# Patient Record
Sex: FEMALE | Race: WHITE | NOT HISPANIC OR LATINO | Employment: FULL TIME | URBAN - METROPOLITAN AREA
[De-identification: names, ages, dates, MRNs, and addresses within clinical notes are randomized per-mention and may not be internally consistent; named-entity substitution may affect disease eponyms.]

---

## 2017-01-13 ENCOUNTER — TELEPHONE (OUTPATIENT)
Dept: OBSTETRICS AND GYNECOLOGY | Facility: CLINIC | Age: 42
End: 2017-01-13

## 2017-01-13 NOTE — TELEPHONE ENCOUNTER
Pt needs a letter stating when she goes back to work next week that she can only work 1/2 days. Pt would like this sent via e-mail.      Pt e-mail [email or web address]

## 2017-01-13 NOTE — LETTER
January 13, 2017    Reshma Rogers  [address] [address]         [facility]  [address]  [address] [place] [address]  Phone: [phone]  Fax: [phone] January 13, 2017     Patient: Reshma Rogers   YOB: 1975   Date of Visit: 1/13/2017       To Whom It May Concern:    It is my medical opinion that Reshma Rogers should only work half days since she had a hysterectomy on 12-30-16 & is still recovering until her next office visit.   If you have any questions or concerns, please don't hesitate to call.    Sincerely,        Guero Zhu M.D.

## 2017-01-19 ENCOUNTER — OFFICE VISIT (OUTPATIENT)
Dept: OBSTETRICS AND GYNECOLOGY | Facility: CLINIC | Age: 42
End: 2017-01-19
Payer: COMMERCIAL

## 2017-01-19 VITALS
DIASTOLIC BLOOD PRESSURE: 84 MMHG | HEIGHT: 64 IN | SYSTOLIC BLOOD PRESSURE: 138 MMHG | WEIGHT: 171.94 LBS | BODY MASS INDEX: 29.35 KG/M2

## 2017-01-19 DIAGNOSIS — Z48.89 POSTOPERATIVE VISIT: Primary | ICD-10-CM

## 2017-01-19 PROCEDURE — 99999 PR PBB SHADOW E&M-EST. PATIENT-LVL III: CPT | Mod: PBBFAC,,, | Performed by: OBSTETRICS & GYNECOLOGY

## 2017-01-19 PROCEDURE — 99024 POSTOP FOLLOW-UP VISIT: CPT | Mod: S$GLB,,, | Performed by: OBSTETRICS & GYNECOLOGY

## 2017-01-19 NOTE — MR AVS SNAPSHOT
"    [facility]  [address]  [place] [address]  Phone: [phone]  Fax: [phone]                  Reshma Calvo [facility]   2017 11:15 AM   Office Visit    Description:  Female : 1975   Provider:  Guero Zhu MD   Department:  [facility]           Reason for Visit     Post-op Evaluation                To Do List           Future Appointments        Provider Department Dept Phone    2017 1:00 PM Guero Zhu MD [facility] [phone]      Goals (5 Years of Data)     None      [facility]     [facility] -  Assistance  Registered nurses in the [facility] Call Center provide clinical advisement, health education, appointment booking, and other advisory services.  Call for this free service at [phone].             Medications           Message regarding Medications     Verify the changes and/or additions to your medication regime listed below are the same as discussed with your clinician today.  If any of these changes or additions are incorrect, please notify your healthcare provider.        STOP taking these medications     ondansetron (ZOFRAN, AS HYDROCHLORIDE,) 4 MG tablet Take 1 tablet (4 mg total) by mouth every 6 (six) hours as needed for Nausea.    oxycodone-acetaminophen (PERCOCET) 7.5-325 mg per tablet Take 1 tablet by mouth every 4 (four) hours as needed for Pain.           Verify that the below list of medications is an accurate representation of the medications you are currently taking.  If none reported, the list may be blank. If incorrect, please contact your healthcare provider. Carry this list with you in case of emergency.           Current Medications     valacyclovir (VALTREX) 500 MG tablet            Clinical Reference Information           Vital Signs - Last Recorded  Most recent update: 2017 11:37 AM by Isamar May MA    BP Ht Wt LMP BMI    138/84 5' 4" (1.626 m) " 78 kg (171 lb 15.3 oz) 12/03/2016 (Exact Date) 29.52 kg/m2      Blood Pressure          Most Recent Value    BP  138/84      Allergies as of 1/19/2017     Ambien [Zolpidem]    Aspirin    Nsaids (Non-steroidal Anti-inflammatory Drug)    Neosporin [Neomycin-bacitracin-polymyxin]      Immunizations Administered on Date of Encounter - 1/19/2017     None

## 2017-01-19 NOTE — PROGRESS NOTES
"Subjective:       Reshma Rogers is a 41 y.o. female who presents to the clinic 2 weeks status post Davinci assisted hysterectomy for fibroids. Eating a regular diet without difficulty. Bowel movements are normal. The patient is not having any pain.    The following portions of the patient's history were reviewed and updated as appropriate: allergies, past family history, past medical history, past social history, past surgical history and problem list.    Review of Systems  Pertinent items are noted in HPI.      Objective:        Visit Vitals    /84    Ht 5' 4" (1.626 m)    Wt 78 kg (171 lb 15.3 oz)    LMP 12/03/2016 (Exact Date)    BMI 29.52 kg/m2     General:  alert, appears stated age and cooperative   Abdomen: soft, bowel sounds active, non-tender   Incision:       healing well, no drainage, no erythema, no hernia, no seroma, no swelling, no dehiscence, incision well approximated         Assessment:      Doing well postoperatively.  Operative findings again reviewed. Pathology report discussed.      Plan:      1. Continue any current medications.  2. Wound care discussed.  3. Activity restrictions: no sports  4. Anticipated return to work: 2 wks  5. Follow up: . 6 weeks  "

## 2017-01-20 ENCOUNTER — TELEPHONE (OUTPATIENT)
Dept: OBSTETRICS AND GYNECOLOGY | Facility: CLINIC | Age: 42
End: 2017-01-20

## 2017-01-20 NOTE — TELEPHONE ENCOUNTER
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

## 2017-01-20 NOTE — TELEPHONE ENCOUNTER
Bone pt, red, tender spot on incision that Dr Zhu took the steristrip off of yesterday when she was here. It is not bleeding or oozing but hurts when water hits it and pt would like to know if she needs to be seen again or if she should cover it . Also, pt needs note saying she

## 2017-01-20 NOTE — TELEPHONE ENCOUNTER
Meryl  Agree all inc looked great yesterday   Agree with bandaid  If not better by Mon then she can come in    Isamar can you send a note to work that she can return and cont 1/2 days for until first week of feb

## 2017-01-20 NOTE — TELEPHONE ENCOUNTER
"3 weeks post op pt---After taking the steri strip off at her post op visit yesterday she noticed the area is more tender and burns when water hits it.  She reports  "raw skin" on her incision like the skin is not closed all the way (at the surface).  It is not draining or bleeding.  She is worried because her incision opened after her .  Advised no heavy lifting or exercise and to monitor over the weekend and if it worsens to call Monday for an appt.  Reassured her she can put a band aid on it when she showers if it helps with the burning.      Isamar,  She needs a note for to continue working half days next week (per Dr. Zhu) emailed to [email or web address].  She thought you were sending it yesterday but never got it in her email or junk email.      Please call when you send note.   "

## 2017-01-23 NOTE — TELEPHONE ENCOUNTER
Pt calling to check on her note for work. She said this Friday, 1/27 will be her 4th week after her surgery and she said at her appt on 1/19 it was discussed to return back to work in 2 weeks. She needs a new note emailed to her at [email or web address].

## 2017-01-27 ENCOUNTER — OFFICE VISIT (OUTPATIENT)
Dept: OBSTETRICS AND GYNECOLOGY | Facility: CLINIC | Age: 42
End: 2017-01-27
Payer: COMMERCIAL

## 2017-01-27 VITALS
HEIGHT: 64 IN | WEIGHT: 172.75 LBS | DIASTOLIC BLOOD PRESSURE: 62 MMHG | BODY MASS INDEX: 29.49 KG/M2 | SYSTOLIC BLOOD PRESSURE: 110 MMHG

## 2017-01-27 DIAGNOSIS — Z09 POSTOP CHECK: Primary | ICD-10-CM

## 2017-01-27 PROCEDURE — 99999 PR PBB SHADOW E&M-EST. PATIENT-LVL II: CPT | Mod: PBBFAC,,, | Performed by: OBSTETRICS & GYNECOLOGY

## 2017-01-27 PROCEDURE — 99024 POSTOP FOLLOW-UP VISIT: CPT | Mod: S$GLB,,, | Performed by: OBSTETRICS & GYNECOLOGY

## 2017-01-27 NOTE — MR AVS SNAPSHOT
"    [facility]  [address]  [place] [address]  Phone: [phone]  Fax: [phone]                  Reshma Calvo [facility]   2017 8:45 AM   Office Visit    Description:  Female : 1975   Provider:  Rachana Suh MD   Department:  [facility]           Reason for Visit     Post-op Evaluation           Diagnoses this Visit        Comments    Postop check    -  Primary            To Do List           Future Appointments        Provider Department Dept Phone    2017 1:00 PM Guero Zhu MD [facility] [phone]      Goals (5 Years of Data)     None      Follow-Up and Disposition     Return in 3 weeks (on 2017) for postop with Dr. Zhu.    Follow-up and Disposition History      [facility]     [facility] Nurse Care [facility] -  Assistance  Registered nurses in the [facility] On Call Center provide clinical advisement, health education, appointment booking, and other advisory services.  Call for this free service at [phone].             Medications           Message regarding Medications     Verify the changes and/or additions to your medication regime listed below are the same as discussed with your clinician today.  If any of these changes or additions are incorrect, please notify your healthcare provider.             Verify that the below list of medications is an accurate representation of the medications you are currently taking.  If none reported, the list may be blank. If incorrect, please contact your healthcare provider. Carry this list with you in case of emergency.           Current Medications     valacyclovir (VALTREX) 500 MG tablet            Clinical Reference Information           Vital Signs - Last Recorded  Most recent update: 2017  9:13 AM by Brooklyn Pagan MA    BP Ht Wt LMP BMI    110/62 5' 4" (1.626 m) 78.4 kg (172 lb 11.7 oz) 2016 (Exact Date) 29.65 kg/m2      Blood Pressure  "         Most Recent Value    BP  110/62      Allergies as of 1/27/2017     Ambien [Zolpidem]    Aspirin    Nsaids (Non-steroidal Anti-inflammatory Drug)    Neosporin [Neomycin-bacitracin-polymyxin]      Immunizations Administered on Date of Encounter - 1/27/2017     None

## 2017-02-09 ENCOUNTER — PATIENT MESSAGE (OUTPATIENT)
Dept: ADMINISTRATIVE | Facility: OTHER | Age: 42
End: 2017-02-09

## 2017-02-16 ENCOUNTER — OFFICE VISIT (OUTPATIENT)
Dept: OBSTETRICS AND GYNECOLOGY | Facility: CLINIC | Age: 42
End: 2017-02-16
Payer: COMMERCIAL

## 2017-02-16 VITALS
WEIGHT: 175.69 LBS | SYSTOLIC BLOOD PRESSURE: 122 MMHG | HEIGHT: 64 IN | DIASTOLIC BLOOD PRESSURE: 70 MMHG | BODY MASS INDEX: 29.99 KG/M2

## 2017-02-16 DIAGNOSIS — D24.9 FIBROADENOMA OF BREAST, UNSPECIFIED LATERALITY: Primary | ICD-10-CM

## 2017-02-16 DIAGNOSIS — Z48.89 POSTOPERATIVE VISIT: ICD-10-CM

## 2017-02-16 PROCEDURE — 99024 POSTOP FOLLOW-UP VISIT: CPT | Mod: S$GLB,,, | Performed by: OBSTETRICS & GYNECOLOGY

## 2017-02-16 PROCEDURE — 99999 PR PBB SHADOW E&M-EST. PATIENT-LVL III: CPT | Mod: PBBFAC,,, | Performed by: OBSTETRICS & GYNECOLOGY

## 2017-02-16 NOTE — MR AVS SNAPSHOT
"    [facility]  [address]  [place] [address]  Phone: [phone]  Fax: [phone]                  Reshma Calvo [facility]   2017 1:00 PM   Office Visit    Description:  Female : 1975   Provider:  Guero Zhu MD   Department:  [facility]           Reason for Visit     Post-op Evaluation           Diagnoses this Visit        Comments    Fibroadenoma of breast, unspecified laterality    -  Primary            To Do List           Future Appointments        Provider Department Dept Phone    2017 2:45 PM Guero Zhu MD [facility] [phone]      Goals (5 Years of Data)     None      [facility]     [facility] Care [facility] -  Assistance  Registered nurses in the [facility] provide clinical advisement, health education, appointment booking, and other advisory services.  Call for this free service at [phone].             Medications           Message regarding Medications     Verify the changes and/or additions to your medication regime listed below are the same as discussed with your clinician today.  If any of these changes or additions are incorrect, please notify your healthcare provider.             Verify that the below list of medications is an accurate representation of the medications you are currently taking.  If none reported, the list may be blank. If incorrect, please contact your healthcare provider. Carry this list with you in case of emergency.           Current Medications     valacyclovir (VALTREX) 500 MG tablet            Clinical Reference Information           Your Vitals Were     BP Height Weight Last Period BMI    122/70 5' 4" (1.626 m) 79.7 kg (175 lb 11.3 oz) 2016 (Exact Date) 30.16 kg/m2      Blood Pressure          Most Recent Value    BP  122/70      Allergies as of 2017     Ambien [Zolpidem]    Aspirin    Nsaids (Non-steroidal Anti-inflammatory Drug)    " Neosporin [Neomycin-bacitracin-polymyxin]      Immunizations Administered on Date of Encounter - 2/16/2017     None      Orders Placed During Today's Visit      Normal Orders This Visit    US Breast Left Complete     Future Labs/Procedures Expected by Expires    US Breast Left Complete  2/16/2017 4/16/2018      Language Assistance Services     ATTENTION: Language assistance services are available, free of charge. Please call [phone].      ATENCIÓN: Si habla amanda, tiene a cortes disposición servicios gratuitos de asistencia lingüística. Llame al [phone].     [facility] Ý: N?u b?n nói Ti?ng Vi?t, có các d?ch v? h? tr? ngôn ng? mi?n phí dành cho b?n. G?i s? [phone].         [facility] complies with applicable Federal civil rights laws and does not discriminate on the basis of race, color, national origin, age, disability, or sex.

## 2017-02-16 NOTE — PROGRESS NOTES
CC: Postop exam    Reshma Rogers is a 41 y.o. female  presents for a postop exam exam.  She is established.  LMP: Patient's last menstrual period was 2016 (exact date)..   Patient without complaints.  Feeling much better.  Back at work  Now 7 weeks postop from da Brandon hysterectomy.     Health Maintenance   Topic Date Due    Lipid Panel  1975    Pneumococcal PCV13 (High Risk) (1 - PCV13 Required) 10/10/1976    TETANUS VACCINE  10/10/1993    Pneumococcal PPSV23 (High Risk) (1) 10/10/1993    Mammogram  10/10/2015    Influenza Vaccine  2016    Pap Smear  2019         Past Medical History   Diagnosis Date    Abnormal Pap smear of cervix      prior had mild dysplasia treared with cryo 2004,nl since per pt     Anxiety     Anxiety and depression     Asthma     Herpes simplex virus (HSV) infection     Infertility, female     PCOS (polycystic ovarian syndrome)     Polycythemia     Thrombophilia      MTHFR I7850G/ [id number]    Thyroid disease      history of hyporthyroidism       Past Surgical History   Procedure Laterality Date    Rhinoplasty tip  2005    Breast augmentation  2006     & removal     section      Gynecologic cryosurgery  2004    Gynecologic cryosurgery  2004    Dilation and curettage of uterus      Hysterectomy  2016     . Robotic Hysterectomy       OB History    Para Term  AB SAB TAB Ectopic Multiple Living   2 1  1 1 1    1      # Outcome Date GA Lbr Dennis/2nd Weight Sex Delivery Anes PTL Lv   2   33w0d  2.07 kg (4 lb 9 oz) M CS-LTranv   Y   1 SAB  6w0d                 Family History   Problem Relation Age of Onset    Deep vein thrombosis Father     Eclampsia Father     Breast cancer Maternal Grandmother     Diabetes Mother     Ovarian cancer Maternal Aunt     Colon cancer Neg Hx        Social History   Substance Use Topics    Smoking status: Never Smoker    Smokeless tobacco: None     "Alcohol use No       Visit Vitals    /70    Ht 5' 4" (1.626 m)    Wt 79.7 kg (175 lb 11.3 oz)    LMP 12/03/2016 (Exact Date)    BMI 30.16 kg/m2         ROS:  GENERAL: Denies weight gain or weight loss. Feeling well overall.   SKIN: Denies rash or lesions.   ABDOMEN: No abdominal pain, constipation, diarrhea, nausea, vomiting or rectal bleeding.   URINARY: No frequency, dysuria, hematuria, or burning on urination.  BREASTS: The patient performs breast self-examination and denies pain, lumps, or nipple discharge.   HEMATOLOGIC: No easy bruisability or excessive bleeding.  MUSCULOSKELETAL: Denies joint pain or swelling.   NEUROLOGIC: Denies syncope or weakness.   PSYCHIATRIC: Denies depression, anxiety or mood swings.    Physical Exam:    APPEARANCE: Well nourished, well developed, in no acute distress.  AFFECT: WNL, alert and oriented x 3  SKIN: Abdominal incisions clear dry and intact  ABDOMEN: Soft.  No tenderness or masses.  No hepatosplenomegaly.  No hernias.  PELVIC: Normal external genitalia without lesions.  Normal hair distribution.  Adequate perineal body, normal urethral meatus.  Vagina moist and well rugated without lesions or discharge.  Vaginal cuff intact.  No signs of granulation tissue.  Cervix and uterus absent. .  Adnexa without masses or tenderness.    EXTREMITIES: No edema.    ASSESSMENT AND PLAN  1. Fibroadenoma of breast, unspecified laterality  US Breast Left Complete    US Breast Left Complete   2. Postoperative visit   postop doing well.  7 weeks out from da Brandon hysterectomy.  May resume normal activity including exercise.  Can resume intercourse at 8 weeks postop.         Patient was counseled today on A.C.S. Pap guidelines and recommendations for yearly pelvic exams, mammograms and monthly self breast exams; to see her PCP for other health maintenance.     No Follow-up on file.      "

## 2017-04-07 ENCOUNTER — PATIENT MESSAGE (OUTPATIENT)
Dept: OBSTETRICS AND GYNECOLOGY | Facility: CLINIC | Age: 42
End: 2017-04-07

## 2017-04-07 ENCOUNTER — TELEPHONE (OUTPATIENT)
Dept: OBSTETRICS AND GYNECOLOGY | Facility: CLINIC | Age: 42
End: 2017-04-07

## 2017-04-07 NOTE — TELEPHONE ENCOUNTER
Pt states she has a 6mo follow up breast ultrasound for a fibroadenoma in May.  Last night she found a different lump that is palpable and visible to the eye in the same breast.  Recommended she move up the breast ultrasound a couple weeks to be seen sooner.      Dr. Zhu should we see her again for this mass?

## 2017-04-07 NOTE — TELEPHONE ENCOUNTER
The earliest she was able to move it up to was Thursday 4/13 at 2:30pm at [facility].  Scheduled appt with Maren 4/17 @0840 when you are in the office.

## 2017-04-07 NOTE — TELEPHONE ENCOUNTER
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 Last night she found a lump that is visible to the eye and can be felt. Pt states it is no where near the original fibroadenoma. Pt states that she isn't sure if the fibroadenoma moved or what. Pt would like to know if she should move her u/s up or what she should do. Please call pt at [phone].

## 2017-04-13 DIAGNOSIS — N63.0 BREAST LUMP IN FEMALE: Primary | ICD-10-CM

## 2017-04-17 ENCOUNTER — OFFICE VISIT (OUTPATIENT)
Dept: OBSTETRICS AND GYNECOLOGY | Facility: CLINIC | Age: 42
End: 2017-04-17
Payer: COMMERCIAL

## 2017-04-17 ENCOUNTER — TELEPHONE (OUTPATIENT)
Dept: OBSTETRICS AND GYNECOLOGY | Facility: CLINIC | Age: 42
End: 2017-04-17

## 2017-04-17 VITALS
SYSTOLIC BLOOD PRESSURE: 118 MMHG | DIASTOLIC BLOOD PRESSURE: 80 MMHG | BODY MASS INDEX: 28.63 KG/M2 | HEIGHT: 64 IN | WEIGHT: 167.69 LBS

## 2017-04-17 DIAGNOSIS — N63.23 BREAST LUMP ON LEFT SIDE AT 5 O'CLOCK POSITION: ICD-10-CM

## 2017-04-17 DIAGNOSIS — N63.21 BREAST LUMP ON LEFT SIDE AT 1 O'CLOCK POSITION: Primary | ICD-10-CM

## 2017-04-17 DIAGNOSIS — N63.21 BREAST LUMP ON LEFT SIDE AT 1 O'CLOCK POSITION: ICD-10-CM

## 2017-04-17 DIAGNOSIS — N63.25 BREAST LUMP ON LEFT SIDE AT 6 O'CLOCK POSITION: Primary | ICD-10-CM

## 2017-04-17 PROCEDURE — 1160F RVW MEDS BY RX/DR IN RCRD: CPT | Mod: S$GLB,,, | Performed by: NURSE PRACTITIONER

## 2017-04-17 PROCEDURE — 99999 PR PBB SHADOW E&M-EST. PATIENT-LVL III: CPT | Mod: PBBFAC,,, | Performed by: NURSE PRACTITIONER

## 2017-04-17 PROCEDURE — 99213 OFFICE O/P EST LOW 20 MIN: CPT | Mod: S$GLB,,, | Performed by: NURSE PRACTITIONER

## 2017-04-17 RX ORDER — AMOXICILLIN 500 MG
2 CAPSULE ORAL DAILY
COMMUNITY

## 2017-04-17 NOTE — MR AVS SNAPSHOT
[facility]  [address]  [place] [address]  Phone: [phone]  Fax: [phone]                  Reshma Calvo [facility]   2017 8:40 AM   Office Visit    Description:  Female : 1975   Provider:  Maren Peña NP   Department:  [facility]           Reason for Visit     Follow-up           Diagnoses this Visit        Comments    Breast lump on left side at 1 o'clock position    -  Primary     Breast lump on left side at 5 o'clock position                To Do List           Future Appointments        Provider Department Dept Phone    2017 2:45 PM Guero Zhu MD [facility] [phone]      Goals (5 Years of Data)     None      Follow-Up and Disposition     Return if symptoms worsen or fail to improve.    Follow-up and Disposition History      [facility]     [facility] On Call Nurse Care Line - 24/ Assistance  Unless otherwise directed by your provider, please contact [facility] On-Call, our nurse care line that is available for  assistance.     Registered nurses in the [facility] On Call Center provide: appointment scheduling, clinical advisement, health education, and other advisory services.  Call: [phone] (toll free)               Medications           Message regarding Medications     Verify the changes and/or additions to your medication regime listed below are the same as discussed with your clinician today.  If any of these changes or additions are incorrect, please notify your healthcare provider.             Verify that the below list of medications is an accurate representation of the medications you are currently taking.  If none reported, the list may be blank. If incorrect, please contact your healthcare provider. Carry this list with you in case of emergency.           Current Medications     fish oil-omega-3 fatty acids 300-1,000 mg capsule Take 2 g by mouth once daily.    valacyclovir (VALTREX) 500 MG  "tablet            Clinical Reference Information           Your Vitals Were     BP Height Weight Last Period BMI    118/80 5' 4" (1.626 m) 76.1 kg (167 lb 10.6 oz) 12/03/2016 (Exact Date) 28.78 kg/m2      Blood Pressure          Most Recent Value    BP  118/80      Allergies as of 4/17/2017     Ambien [Zolpidem]    Aspirin    Nsaids (Non-steroidal Anti-inflammatory Drug)    Neosporin [Neomycin-bacitracin-polymyxin]      Immunizations Administered on Date of Encounter - 4/17/2017     None      Language Assistance Services     ATTENTION: Language assistance services are available, free of charge. Please call [phone].      ATENCIÓN: Si trung patel, tiene a cortes disposición servicios gratuitos de asistencia lingüística. Llame al [phone].     ALONDRA Ý: N?u b?n nói Ti?ng Vi?t, có các d?ch v? h? tr? ngôn ng? mi?n phí dành cho b?n. G?i s? [phone].         [facility] complies with applicable Federal civil rights laws and does not discriminate on the basis of race, color, national origin, age, disability, or sex.        "

## 2017-04-17 NOTE — TELEPHONE ENCOUNTER
Pt called to follow up with Maren to see if she ever received the fax from [facility]. Pt hasn't heard back from anyone and was concerned.

## 2017-04-17 NOTE — PROGRESS NOTES
"Chief Complaint   Patient presents with    Follow-up     Dr Zhu Pt:  Follow up on MMG and U/S for Left Breast Lump, done 17 @ [facility]       Reshma Rogers is a 41 y.o. female  presents for a Follow-up (Dr Zhu Pt:  Follow up on MMG and U/S for Left Breast Lump, done 17 @ [facility])  Pt has history of left breast fibroadenoma (around 1 o'clock) and has been getting mammogram & U/S every 6 months to follow at [facility].  She also reports that last week she noticed a "large lump to her left breast, beneath areola; non-painful".    Complaints/Concerns: left breast lump x 2    Patient's last menstrual period was 2016 (exact date).    Past Medical History:   Diagnosis Date    Abnormal Pap smear of cervix     prior had mild dysplasia treared with cryo ,nl since per pt     Anxiety     Anxiety and depression     Asthma     Breast disorder 2017    Left breast Lump    Herpes simplex virus (HSV) infection     Infertility, female     PCOS (polycystic ovarian syndrome)     Polycythemia     Thrombophilia     MTHFR D4779R/ B8211A    Thyroid disease     history of hyporthyroidism       Past Surgical History:   Procedure Laterality Date    breast augmentation  2006    & removal     SECTION      DILATION AND CURETTAGE OF UTERUS      GYNECOLOGIC CRYOSURGERY  2004    GYNECOLOGIC CRYOSURGERY  2004    HYSTERECTOMY  2016    . Robotic Hysterectomy    RHINOPLASTY TIP         OB History    Para Term  AB SAB TAB Ectopic Multiple Living   2 1  1 1 1    1      # Outcome Date GA Lbr Dennis/2nd Weight Sex Delivery Anes PTL Lv   2   33w0d  2.07 kg (4 lb 9 oz) M CS-LTranv Spinal  Y   1 SAB  6w0d                 Family History   Problem Relation Age of Onset    Deep vein thrombosis Father     Eclampsia Father     Breast cancer Maternal Grandmother     Cancer Maternal Grandmother     Diabetes Mother     Ovarian cancer Maternal Aunt     " "Colon cancer Neg Hx        Social History   Substance Use Topics    Smoking status: Never Smoker    Smokeless tobacco: None    Alcohol use Yes      Comment: Occational       /80  Ht 5' 4" (1.626 m)  Wt 76.1 kg (167 lb 10.6 oz)  LMP 12/03/2016 (Exact Date) Comment: TVH/LSO 12-30-16  BMI 28.78 kg/m2    ROS:    Review of Systems   Constitutional: Negative.    Respiratory: Negative.    Cardiovascular: Negative.    Neurological: Negative.        Physical Exam:    Physical Exam:   Constitutional: She is oriented to person, place, and time. She appears well-developed.    HENT:   Head: Normocephalic.    Eyes: Pupils are equal, round, and reactive to light.    Neck: Normal range of motion.     Pulmonary/Chest: Effort normal. Right breast exhibits no inverted nipple, no mass, no nipple discharge, no skin change, no tenderness, presence, no bleeding and no swelling. Left breast exhibits mass. Left breast exhibits no inverted nipple, no nipple discharge, no skin change, no tenderness, presence, no bleeding and no swelling. Breasts are symmetrical (bilat implpants noted).                      Musculoskeletal: Normal range of motion.       Neurological: She is alert and oriented to person, place, and time.    Skin: Skin is warm and dry.        ASSESSMENT AND PLAN  Breast lump on left side at 1 o'clock position    Breast lump on left side at 5 o'clock position    * D/W patient that report has not been done yet by radiologist at [facility], but that I will continue to look out for it today.  At that point, we can discuss plan of care.  (She was told by u/s tech at [facility] that radiologist did not se anything on mammo or u/s, so pt is concerned since she can still feel both lumps.  May consider second opinion with breast surgeon at [facility].    Patient was counseled today on A.C.S. Pap guidelines and recommendations for yearly pelvic exams, mammograms and monthly self breast exams; to see her PCP for other health maintenance. "     Return if symptoms worsen or fail to improve.

## 2017-04-19 ENCOUNTER — TELEPHONE (OUTPATIENT)
Dept: OBSTETRICS AND GYNECOLOGY | Facility: CLINIC | Age: 42
End: 2017-04-19

## 2017-04-19 NOTE — TELEPHONE ENCOUNTER
Pt says she got a call from [facility] to schedule breast u/s but pt thought Maren was supposed to send her to [facility]. Pt will call ins co to find out if they will cover her having a repeat u/s at [facility].

## 2017-04-21 ENCOUNTER — HOSPITAL ENCOUNTER (OUTPATIENT)
Dept: RADIOLOGY | Facility: HOSPITAL | Age: 42
Discharge: HOME | End: 2017-04-21
Attending: OBSTETRICS & GYNECOLOGY
Payer: COMMERCIAL

## 2017-04-21 DIAGNOSIS — N63.0 BREAST LUMP IN FEMALE: ICD-10-CM

## 2017-04-25 ENCOUNTER — HOSPITAL ENCOUNTER (OUTPATIENT)
Dept: RADIOLOGY | Facility: HOSPITAL | Age: 42
Discharge: HOME | End: 2017-04-25
Attending: RADIOLOGY

## 2017-04-26 ENCOUNTER — TELEPHONE (OUTPATIENT)
Dept: OBSTETRICS AND GYNECOLOGY | Facility: CLINIC | Age: 42
End: 2017-04-26

## 2017-04-27 ENCOUNTER — PATIENT MESSAGE (OUTPATIENT)
Dept: ADMINISTRATIVE | Facility: OTHER | Age: 42
End: 2017-04-27

## 2017-04-27 ENCOUNTER — TELEPHONE (OUTPATIENT)
Dept: RADIOLOGY | Facility: HOSPITAL | Age: 42
End: 2017-04-27

## 2017-04-27 NOTE — TELEPHONE ENCOUNTER
Spoke with patient. Reviewed breast biopsy procedure and reviewed instructions for breast biopsy. Patient expressed understanding and all questions were answered. Provided patient with my phone number to call for any further concerns or questions.   Patient scheduled ultrasound and breast biopsy at the [facility] on 5/11/2017

## 2017-05-11 ENCOUNTER — HOSPITAL ENCOUNTER (OUTPATIENT)
Dept: RADIOLOGY | Facility: HOSPITAL | Age: 42
Discharge: HOME | End: 2017-05-11
Attending: OBSTETRICS & GYNECOLOGY
Payer: COMMERCIAL

## 2017-05-11 DIAGNOSIS — R92.8 ABNORMAL MAMMOGRAM: ICD-10-CM

## 2017-05-11 PROCEDURE — 19083 BX BREAST 1ST LESION US IMAG: CPT | Mod: ,,, | Performed by: RADIOLOGY

## 2017-05-11 PROCEDURE — 88305 TISSUE EXAM BY PATHOLOGIST: CPT | Performed by: PATHOLOGY

## 2017-05-11 PROCEDURE — 76642 ULTRASOUND BREAST LIMITED: CPT | Mod: 26,LT,, | Performed by: RADIOLOGY

## 2017-05-11 PROCEDURE — 76642 ULTRASOUND BREAST LIMITED: CPT | Mod: TC,LT

## 2017-05-11 PROCEDURE — 77065 DX MAMMO INCL CAD UNI: CPT | Mod: TC,LT

## 2017-05-11 PROCEDURE — 77065 DX MAMMO INCL CAD UNI: CPT | Mod: 26,LT,, | Performed by: RADIOLOGY

## 2017-05-11 PROCEDURE — 88305 TISSUE EXAM BY PATHOLOGIST: CPT | Mod: 26,,, | Performed by: PATHOLOGY

## 2017-05-11 PROCEDURE — 27201068 US BREAST BIOPSY WITH IMAGING 1ST SITE LEFT

## 2017-05-12 ENCOUNTER — TELEPHONE (OUTPATIENT)
Dept: SURGERY | Facility: CLINIC | Age: 42
End: 2017-05-12

## 2017-05-12 NOTE — TELEPHONE ENCOUNTER
Called patient with the results of her left breast biopsy, explained that it showed a fibroadenoma,  no atypia/benign, all questions answered, pt advised to follow up in 6 months with repeat imaging per core biopsy protocol, advised case will be reviewed in the weekly core conference and pt will be notified if there are any changes. Patient verbalized understanding of all information

## 2017-05-25 ENCOUNTER — OFFICE VISIT (OUTPATIENT)
Dept: OBSTETRICS AND GYNECOLOGY | Facility: CLINIC | Age: 42
End: 2017-05-25
Payer: COMMERCIAL

## 2017-05-25 VITALS
HEIGHT: 64 IN | SYSTOLIC BLOOD PRESSURE: 126 MMHG | DIASTOLIC BLOOD PRESSURE: 84 MMHG | BODY MASS INDEX: 27.48 KG/M2 | WEIGHT: 160.94 LBS

## 2017-05-25 DIAGNOSIS — Z12.39 BREAST CANCER SCREENING: ICD-10-CM

## 2017-05-25 DIAGNOSIS — Z01.419 ENCOUNTER FOR GYNECOLOGICAL EXAMINATION: Primary | ICD-10-CM

## 2017-05-25 PROCEDURE — 99396 PREV VISIT EST AGE 40-64: CPT | Mod: S$GLB,,, | Performed by: OBSTETRICS & GYNECOLOGY

## 2017-05-25 PROCEDURE — 99999 PR PBB SHADOW E&M-EST. PATIENT-LVL III: CPT | Mod: PBBFAC,,, | Performed by: OBSTETRICS & GYNECOLOGY

## 2017-05-25 NOTE — PROGRESS NOTES
CC: Well woman exam    Reshma Rogers is a 41 y.o. female  presents for a well woman exam.  She is established.LMP: Patient's last menstrual period was 2016 (exact date)..  Patient without complaints.  Doing and feeling well.  Status post hysterectomy in December.   Annual Exam, happy with hysterectomy. Son finished chemo. Last mammo  and had breast biopsy at [facility].      Health Maintenance   Topic Date Due    Lipid Panel  1975    Pneumococcal PCV13 (High Risk) (1 - PCV13 Required) 10/10/1976    TETANUS VACCINE  10/10/1993    Pneumococcal PPSV23 (High Risk) (1) 10/10/1993    Influenza Vaccine  2017    Mammogram  2018       Past Medical History:   Diagnosis Date    Abnormal Pap smear of cervix     prior had mild dysplasia treared with cryo ,nl since per pt     Anxiety     Anxiety and depression     Asthma     Breast disorder 2017    Left breast Lump    Herpes simplex virus (HSV) infection     Infertility, female     PCOS (polycystic ovarian syndrome)     Polycythemia     Thrombophilia     MTHFR T0285M/ S0245X    Thyroid disease     history of hyporthyroidism     Past Surgical History:   Procedure Laterality Date    breast augmentation  2006    & removal     SECTION      DILATION AND CURETTAGE OF UTERUS      GYNECOLOGIC CRYOSURGERY  2004    HYSTERECTOMY  2016    . Robotic Hysterectomy    RHINOPLASTY TIP       Family History   Problem Relation Age of Onset    Deep vein thrombosis Father     Eclampsia Father     Breast cancer Maternal Grandmother     Cancer Maternal Grandmother     Diabetes Mother     Ovarian cancer Maternal Aunt     Colon cancer Neg Hx      Social History   Substance Use Topics    Smoking status: Never Smoker    Smokeless tobacco: Not on file    Alcohol use Yes      Comment: Occational     OB History      Para Term  AB Living    2 1  1 1 1    SAB TAB Ectopic Multiple Live  "Births    1    1          /84   Ht 5' 4" (1.626 m)   Wt 73 kg (160 lb 15 oz)   LMP 12/03/2016 (Exact Date)   BMI 27.62 kg/m²     ROS:  GENERAL: Denies weight gain or weight loss. Feeling well overall.   SKIN: Denies rash or lesions.   HEAD: Denies head injury or headache.   NODES: Denies enlarged lymph nodes.   CHEST: Denies chest pain or shortness of breath.   CARDIOVASCULAR: Denies palpitations or left sided chest pain.   ABDOMEN: No abdominal pain, constipation, diarrhea, nausea, vomiting or rectal bleeding.   URINARY: No frequency, dysuria, hematuria, or burning on urination.  REPRODUCTIVE: See HPI.   BREASTS: The patient performs breast self-examination and denies pain, lumps, or nipple discharge.   HEMATOLOGIC: No easy bruisability or excessive bleeding.   MUSCULOSKELETAL: Denies joint pain or swelling.   NEUROLOGIC: Denies syncope or weakness.   PSYCHIATRIC: Denies depression, anxiety or mood swings.    PE:   APPEARANCE: Well nourished, well developed, in no acute distress.  AFFECT: WNL, alert and oriented x 3.  SKIN: No acne or hirsutism.  ABDOMEN: Soft. No tenderness or masses. No hepatosplenomegaly. No hernias.  BREASTS: Symmetrical, no skin changes or visible lesions. No palpable masses, nipple discharge bilaterally.  PELVIC: Normal external female genitalia without lesions. Normal hair distribution. Adequate perineal body, normal urethral meatus. Vaginal cuff intact. Small area of granulation tissue noted at cuff and tx with silver nitrate. Bimanual exam shows uterus and cervix to be surgically absent. Adnexa without masses or tenderness.  EXTREMITIES: No edema.      ICD-10-CM ICD-9-CM    1. Encounter for gynecological examination Z01.419 V72.31 Vag cuff with small area of gran tissue-- tx with silver nitrate   F/u 2 months   2. Breast cancer screening Z12.39 V76.10 Mammo Digital Screening Bilat with Tomosynthesis CAD           Patient was counseled today on A.C.S. Pap guidelines and " recommendations for yearly pelvic exams, mammograms and monthly self breast exams; to see her PCP for other health maintenance.     Return in about 1 year (around 5/25/2018).

## 2017-07-24 ENCOUNTER — OFFICE VISIT (OUTPATIENT)
Dept: OBSTETRICS AND GYNECOLOGY | Facility: CLINIC | Age: 42
End: 2017-07-24
Payer: COMMERCIAL

## 2017-07-24 VITALS
HEIGHT: 64 IN | SYSTOLIC BLOOD PRESSURE: 142 MMHG | WEIGHT: 161.25 LBS | DIASTOLIC BLOOD PRESSURE: 82 MMHG | BODY MASS INDEX: 27.53 KG/M2

## 2017-07-24 DIAGNOSIS — N89.8 GRANULATION TISSUE OF VAGINAL CUFF: Primary | ICD-10-CM

## 2017-07-24 PROCEDURE — 99499 UNLISTED E&M SERVICE: CPT | Mod: S$GLB,,, | Performed by: OBSTETRICS & GYNECOLOGY

## 2017-07-24 PROCEDURE — 99999 PR PBB SHADOW E&M-EST. PATIENT-LVL III: CPT | Mod: PBBFAC,,, | Performed by: OBSTETRICS & GYNECOLOGY

## 2017-08-01 ENCOUNTER — PATIENT MESSAGE (OUTPATIENT)
Dept: OBSTETRICS AND GYNECOLOGY | Facility: CLINIC | Age: 42
End: 2017-08-01

## 2017-09-18 RX ORDER — VALACYCLOVIR HYDROCHLORIDE 500 MG/1
500 TABLET, FILM COATED ORAL DAILY
Qty: 30 TABLET | Refills: 6 | Status: SHIPPED | OUTPATIENT
Start: 2017-09-18 | End: 2018-12-03 | Stop reason: SDUPTHER

## 2017-11-09 ENCOUNTER — HOSPITAL ENCOUNTER (OUTPATIENT)
Dept: RADIOLOGY | Facility: HOSPITAL | Age: 42
Discharge: HOME | End: 2017-11-09
Attending: OBSTETRICS & GYNECOLOGY
Payer: COMMERCIAL

## 2017-11-09 VITALS — WEIGHT: 161 LBS | BODY MASS INDEX: 27.49 KG/M2 | HEIGHT: 64 IN

## 2017-11-09 DIAGNOSIS — Z12.39 BREAST CANCER SCREENING: ICD-10-CM

## 2017-11-09 DIAGNOSIS — N63.25 BREAST LUMP ON LEFT SIDE AT 6 O'CLOCK POSITION: ICD-10-CM

## 2017-11-09 DIAGNOSIS — N63.21 BREAST LUMP ON LEFT SIDE AT 1 O'CLOCK POSITION: ICD-10-CM

## 2017-11-09 PROCEDURE — 77062 BREAST TOMOSYNTHESIS BI: CPT | Mod: 26,,, | Performed by: RADIOLOGY

## 2017-11-09 PROCEDURE — 77066 DX MAMMO INCL CAD BI: CPT | Mod: 26,,, | Performed by: RADIOLOGY

## 2017-11-09 PROCEDURE — 76642 ULTRASOUND BREAST LIMITED: CPT | Mod: 26,RT,, | Performed by: RADIOLOGY

## 2017-11-09 PROCEDURE — 77066 DX MAMMO INCL CAD BI: CPT | Mod: TC

## 2017-11-14 NOTE — PROGRESS NOTES
Lm Cochran,  I have received and reviewed your mammogram and ultrasound report from the breast center.      Yourreport states that your mmg and u/s is completely stable.   You have or will receive your mammogram report with all the info below but I wanted you to be aware...    [facility] provides its mammography patients with an individualized estimated lifetime risk assessment.  The estimate is calculated from information you provided at the time of your mammography visit (such as family history of breast cancer) and your history in your [facility] electronic medical record.      Your estimated lifetime risk of breast cancer (to age 85) based   on Tyrer-Cuzick - 7 risk assessment model is: Tyrer-Cuzick: 21%     Breast cancer risk is a complex and often confusing topic.    The average lifetime risk of breast cancer is approximately 13%.   Patients with a lifetime risk of more than 20% are considered to have an elevated risk of developing breast cancer.    The American Caner Society recommends that women with a 20% or greater lifetime risk consider supplemental screening exams such as annual Breast MRI.    Our breast care coordinators ([facility] - [phone])  can provide additional information and if desired can schedule an appointment with our breast specialists Dr Corado or Dr Shaikh to discuss your risk assessment, risk modification and supplemental screening exams such as breast MRI and other supplemental screening.    If you have any questions or concerns, don't hesitate to message me.  Take care,  Dr Zhu

## 2018-04-26 ENCOUNTER — OFFICE VISIT (OUTPATIENT)
Dept: OBSTETRICS AND GYNECOLOGY | Facility: CLINIC | Age: 43
End: 2018-04-26
Payer: COMMERCIAL

## 2018-04-26 VITALS
WEIGHT: 163.13 LBS | DIASTOLIC BLOOD PRESSURE: 78 MMHG | SYSTOLIC BLOOD PRESSURE: 134 MMHG | HEIGHT: 64 IN | BODY MASS INDEX: 27.85 KG/M2

## 2018-04-26 DIAGNOSIS — Z01.419 ENCOUNTER FOR GYNECOLOGICAL EXAMINATION: Primary | ICD-10-CM

## 2018-04-26 DIAGNOSIS — Z91.89 AT HIGH RISK FOR BREAST CANCER: ICD-10-CM

## 2018-04-26 PROCEDURE — 99396 PREV VISIT EST AGE 40-64: CPT | Mod: S$GLB,,, | Performed by: OBSTETRICS & GYNECOLOGY

## 2018-04-26 PROCEDURE — 99999 PR PBB SHADOW E&M-EST. PATIENT-LVL III: CPT | Mod: PBBFAC,,, | Performed by: OBSTETRICS & GYNECOLOGY

## 2018-04-29 NOTE — PROGRESS NOTES
"CCChi Complaint Well woman exam:  Well Woman (Annual Exam, last mammo  [facility], elevated risk assesment 21%)      Reshma Rogers is a 42 y.o. female  presents for a well woman exam.    She is established.  No issues, problems, or complaints.   Specifically, patient denies abnormal vaginal bleeding, discharge and odor", or "pelvic pain.  No menopausal sx       LMP: none  S/p Hyst  Cycles:None s/p hysterectomy    Last pap: 2016 normal hpv neg  Last MM2017 Normal 21%  Last colonoscopy: n/a      Current Outpatient Prescriptions:     fish oil-omega-3 fatty acids 300-1,000 mg capsule, Take 2 g by mouth once daily., Disp: , Rfl:     valacyclovir (VALTREX) 500 MG tablet, Take 1 tablet (500 mg total) by mouth once daily., Disp: 30 tablet, Rfl: 6    Past Medical History:   Diagnosis Date    Abnormal Pap smear of cervix     prior had mild dysplasia treared with cryo ,nl since per pt     Anxiety     Anxiety and depression     Asthma     Breast disorder 2017    Left breast Lump    Herpes simplex virus (HSV) infection     Infertility, female     PCOS (polycystic ovarian syndrome)     Polycythemia     Thrombophilia     MTHFR Y4088G/ S3672G    Thyroid disease     history of hyporthyroidism       Past Surgical History:   Procedure Laterality Date    BREAST AUGMENTATION  2006    BREAST BIOPSY Left 2017    BREAST IMPLANT REMOVAL        SECTION      DILATION AND CURETTAGE OF UTERUS      GYNECOLOGIC CRYOSURGERY  2004    HYSTERECTOMY  2016    . Robotic DVH/LSO (Right ovary remains)     OOPHORECTOMY Left 2016    Right ovary remains     RHINOPLASTY TIP         OB History    Para Term  AB Living   4 3 2 1 1 1   SAB TAB Ectopic Multiple Live Births   1       1      # Outcome Date GA Lbr Dennis/2nd Weight Sex Delivery Anes PTL Lv   4   33w0d  2.07 kg (4 lb 9 oz) M CS-LTranv Spinal  RITA   3 SAB  6w0d          2 Term    " "        1 Term                   Family History   Problem Relation Age of Onset    Deep vein thrombosis Father     Eclampsia Father     Breast cancer Maternal Grandmother     Cancer Maternal Grandmother     Diabetes Mother     Ovarian cancer Maternal Aunt     Colon cancer Neg Hx        Social History   Substance Use Topics    Smoking status: Never Smoker    Smokeless tobacco: Never Used    Alcohol use Yes      Comment: Occational         ROS:    GENERAL: Denies weight gain or weight loss. Feeling well overall.   SKIN: Denies rash or lesions.   HEENT: Denies headaches, or vision changes.   CARDIOVASCULAR: Denies palpitations or left sided chest pain.   RESPIRATORY: Denies shortness of breath or dyspnea on exertion.  BREASTS: Denies pain, lumps, or nipple discharge.   ABDOMEN: Denies abdominal pain, constipation, diarrhea, nausea, vomiting, change in appetite or rectal bleeding.   URINARY: Denies frequency, dysuria, hematuria.  NEUROLOGIC: Denies syncope or weakness.   PSYCHIATRIC: Denies depression, anxiety or mood swings.    Physical Exam:  /78   Ht 5' 4" (1.626 m)   Wt 74 kg (163 lb 2.3 oz)   LMP 12/03/2016   BMI 28.00 kg/m²     APPEARANCE: Well nourished, well developed, in no acute distress.  AFFECT: WNL, alert and oriented x 3  SKIN: No acne or hirsutism  BREASTS: Symmetrical, no skin changes.                     No nipple discharge. No palpable masses bilaterally  ABDOMEN: soft Non tender Non distended No masses  PELVIC:   Normal external genitalia without lesions.   Normal urethral meatus. No signif cystocele or rectocele.  Vagina atrophic without lesions or discharge.  Cuff intact  Cervix absent  Adnexa without masses or tenderness.    EXTREMITIES: No edema.    ASSESSMENT AND PLAN  1. Encounter for gynecological examination     2. At high risk for breast cancer         Reshma was seen today for well woman.    Diagnoses and all orders for this visit:    Encounter for gynecological " examination   Annual well woman exam- pap not applicable s/p Hyst  At high risk for breast cancer   Discussed BRCA % and referall to Breast care specialist for MRI eval      Patient was counseled today on A.C.S. Pap guidelines and recommendations for yearly pelvic exams, mammograms and monthly self breast exams; to see her PCP for other health maintenance.     Patient encouraged to register for portal and results will be sent via portal.           Health Maintenance   Topic Date Due    Lipid Panel  1975    Pneumococcal PCV13 (High Risk) (1 - PCV13 Required) 10/10/1976    TETANUS VACCINE  10/10/1993    Pneumococcal PPSV23 (High Risk) (1) 10/10/1993    Influenza Vaccine  08/01/2017    Mammogram  11/09/2018

## 2018-11-05 ENCOUNTER — PATIENT MESSAGE (OUTPATIENT)
Dept: OBSTETRICS AND GYNECOLOGY | Facility: CLINIC | Age: 43
End: 2018-11-05

## 2018-11-05 ENCOUNTER — TELEPHONE (OUTPATIENT)
Dept: OBSTETRICS AND GYNECOLOGY | Facility: CLINIC | Age: 43
End: 2018-11-05

## 2018-11-05 DIAGNOSIS — Z12.31 VISIT FOR SCREENING MAMMOGRAM: Primary | ICD-10-CM

## 2018-11-05 NOTE — TELEPHONE ENCOUNTER
Dr. Zhu patient calling would like orders put in for annual mammo to be done at [facility]

## 2018-11-13 ENCOUNTER — APPOINTMENT (OUTPATIENT)
Dept: RADIOLOGY | Facility: OTHER | Age: 43
End: 2018-11-13
Attending: OBSTETRICS & GYNECOLOGY
Payer: COMMERCIAL

## 2018-11-13 VITALS — WEIGHT: 163 LBS | HEIGHT: 64 IN | BODY MASS INDEX: 27.83 KG/M2

## 2018-11-13 DIAGNOSIS — Z12.31 VISIT FOR SCREENING MAMMOGRAM: ICD-10-CM

## 2018-11-13 PROCEDURE — 77063 BREAST TOMOSYNTHESIS BI: CPT | Mod: 26,,, | Performed by: RADIOLOGY

## 2018-11-13 PROCEDURE — 77063 BREAST TOMOSYNTHESIS BI: CPT | Mod: TC,PN

## 2018-11-13 PROCEDURE — 77067 SCR MAMMO BI INCL CAD: CPT | Mod: 26,,, | Performed by: RADIOLOGY

## 2018-11-14 NOTE — PROGRESS NOTES
Lm Justice,  I have received and reviewed your mammogram report from the breast center.      Your mammogram report states that your mmg is completely normal /stable.   You have or will receive your mammogram report with all the info below but I wanted you to be aware...    [facility] provides its mammography patients with an individualized estimated lifetime risk assessment.  The estimate is calculated from information you provided at the time of your mammography visit (such as family history of breast cancer) and your history in your [facility] electronic medical record.      Your estimated lifetime risk of breast cancer (to age 85) based   on Tyrer-Cuzick - 7 risk assessment model is: Tyrer-Cuzick: 21%     Breast cancer risk is a complex and often confusing topic.    The average lifetime risk of breast cancer is approximately 13%.   Patients with a lifetime risk of more than 20% are considered to have an elevated risk of developing breast cancer.    The American Caner Society recommends that women with a 20% or greater lifetime risk consider supplemental screening exams such as annual Breast MRI.    Our breast care coordinators at [facility] - [phone])  can provide additional information and if desired can schedule an appointment with our breast specialists, Keny Corado or Abbey Shaikh to discuss further your risk assessment, risk modification and supplemental screening exams such as breast MRI and other supplemental screening.    If you have any questions or concerns, don't hesitate to message me.  Take care,  Dr Zhu

## 2018-12-03 RX ORDER — VALACYCLOVIR HYDROCHLORIDE 500 MG/1
500 TABLET, FILM COATED ORAL DAILY
Qty: 30 TABLET | Refills: 6 | Status: SHIPPED | OUTPATIENT
Start: 2018-12-03 | End: 2018-12-03 | Stop reason: SDUPTHER

## 2018-12-03 RX ORDER — VALACYCLOVIR HYDROCHLORIDE 500 MG/1
500 TABLET, FILM COATED ORAL DAILY
Qty: 30 TABLET | Refills: 6 | Status: SHIPPED | OUTPATIENT
Start: 2018-12-03 | End: 2020-02-26 | Stop reason: SDUPTHER

## 2019-01-11 ENCOUNTER — HOSPITAL ENCOUNTER (OUTPATIENT)
Dept: RADIOLOGY | Facility: OTHER | Age: 44
Discharge: HOME | End: 2019-01-11
Attending: PLASTIC SURGERY
Payer: COMMERCIAL

## 2019-01-11 DIAGNOSIS — N63.0 UNSPECIFIED LUMP IN UNSPECIFIED BREAST: ICD-10-CM

## 2019-01-11 PROCEDURE — 77049 MRI BREAST C-+ W/CAD BI: CPT | Mod: 26,,, | Performed by: RADIOLOGY

## 2019-01-11 PROCEDURE — 25500020 PHARM REV CODE 255: Performed by: PLASTIC SURGERY

## 2019-01-11 PROCEDURE — 77049 MRI BREAST C-+ W/CAD BI: CPT | Mod: TC

## 2019-01-11 PROCEDURE — 77049 MRI BREAST W/WO CONTRAST, W/CAD, BILATERAL: ICD-10-PCS | Mod: 26,,, | Performed by: RADIOLOGY

## 2019-01-11 PROCEDURE — A9577 INJ MULTIHANCE: HCPCS | Performed by: PLASTIC SURGERY

## 2019-01-11 RX ADMIN — GADOBENATE DIMEGLUMINE 14 ML: 529 INJECTION, SOLUTION INTRAVENOUS at 10:01

## 2019-05-16 ENCOUNTER — OFFICE VISIT (OUTPATIENT)
Dept: OBSTETRICS AND GYNECOLOGY | Facility: CLINIC | Age: 44
End: 2019-05-16
Payer: COMMERCIAL

## 2019-05-16 VITALS
SYSTOLIC BLOOD PRESSURE: 140 MMHG | HEIGHT: 64 IN | BODY MASS INDEX: 29.4 KG/M2 | WEIGHT: 172.19 LBS | DIASTOLIC BLOOD PRESSURE: 90 MMHG

## 2019-05-16 DIAGNOSIS — Z12.31 ENCOUNTER FOR SCREENING MAMMOGRAM FOR BREAST CANCER: ICD-10-CM

## 2019-05-16 DIAGNOSIS — Z01.419 ENCOUNTER FOR GYNECOLOGICAL EXAMINATION: Primary | ICD-10-CM

## 2019-05-16 PROCEDURE — 99999 PR PBB SHADOW E&M-EST. PATIENT-LVL III: ICD-10-PCS | Mod: PBBFAC,,, | Performed by: OBSTETRICS & GYNECOLOGY

## 2019-05-16 PROCEDURE — 99999 PR PBB SHADOW E&M-EST. PATIENT-LVL III: CPT | Mod: PBBFAC,,, | Performed by: OBSTETRICS & GYNECOLOGY

## 2019-05-16 PROCEDURE — 99396 PR PREVENTIVE VISIT,EST,40-64: ICD-10-PCS | Mod: S$GLB,,, | Performed by: OBSTETRICS & GYNECOLOGY

## 2019-05-16 PROCEDURE — 99396 PREV VISIT EST AGE 40-64: CPT | Mod: S$GLB,,, | Performed by: OBSTETRICS & GYNECOLOGY

## 2019-05-16 NOTE — PROGRESS NOTES
CCChi Complaint Well woman exam:  Annual Exam (hyst has 1 ovary ,  mammo  birads 1 mri breast 2019 normal )      Reshma Rogers is a 43 y.o. female  presents for a well woman exam.    She is established.  .   c/o weight gain over the past year- asking about menopause denies hot flashes or night sweats.  Patient is scheduled to see her primary care physician and will have labs and thyroid labs done there. She will check FSH with labs but most likely they will be normal    LMP: none  S/p Hyst  Last pap: s/p Hyst No pap needed   Last MMG: 2018 Normal had MRI 2019 bilateral fibroadenoma        Current Outpatient Medications:     fish oil-omega-3 fatty acids 300-1,000 mg capsule, Take 2 g by mouth once daily., Disp: , Rfl:     valACYclovir (VALTREX) 500 MG tablet, Take 1 tablet (500 mg total) by mouth once daily., Disp: 30 tablet, Rfl: 6    Past Medical History:   Diagnosis Date    Abnormal Pap smear of cervix     prior had mild dysplasia treared with cryo ,nl since per pt     Anxiety     Anxiety and depression     Asthma     Breast disorder 2017    Left breast Lump    Herpes simplex virus (HSV) infection     Infertility, female     PCOS (polycystic ovarian syndrome)     Polycythemia     Thrombophilia     MTHFR C7280Y/ V4815V    Thyroid disease     history of hyporthyroidism       Past Surgical History:   Procedure Laterality Date    AUGMENTATION OF BREAST      BREAST AUGMENTATION  2006    BREAST BIOPSY Left 2017    BREAST IMPLANT REMOVAL        SECTION      CYSTOSCOPY N/A 2016    Performed by [facility]ero Zhu [facility] at [facility] OR    DILATION AND CURETTAGE OF UTERUS      GYNECOLOGIC CRYOSURGERY  2004    HYSTERECTOMY  2016    . Robotic DVH/LSO (Right ovary remains)     OOPHORECTOMY Left 2016    Right ovary remains     RHINOPLASTY TIP  2005    ROBOTIC ASSISTED LAPAROSCOPIC HYSTERECTOMY with Left Salpingo-Oophorectomy N/A 2016  "   Performed by Guero Zhu MD at [facility] [place]       OB History    Para Term  AB Living   4 3 2 1 1 1   SAB TAB Ectopic Multiple Live Births   1       1      # Outcome Date GA Lbr Dennis/2nd Weight Sex Delivery Anes PTL Lv   4   33w0d  2.07 kg (4 lb 9 oz) M CS-LTranv Spinal  RITA   3 SAB  6w0d          2 Term            1 Term                Family History   Problem Relation Age of Onset    Deep vein thrombosis Father     Eclampsia Father     Breast cancer Maternal Grandmother     Cancer Maternal Grandmother     Diabetes Mother     Ovarian cancer Maternal Aunt     Colon cancer Neg Hx        Social History     Tobacco Use    Smoking status: Never Smoker    Smokeless tobacco: Never Used   Substance Use Topics    Alcohol use: Yes     Comment: Occational    Drug use: No         ROS:    GENERAL: Denies weight gain or weight loss. Feeling well overall.   SKIN: Denies rash or lesions.   HEENT: Denies headaches, or vision changes.   CARDIOVASCULAR: Denies palpitations or left sided chest pain.   RESPIRATORY: Denies shortness of breath or dyspnea on exertion.  BREASTS: Denies pain, lumps, or nipple discharge.   ABDOMEN: Denies abdominal pain, constipation, diarrhea, nausea, vomiting, change in appetite or rectal bleeding.   URINARY: Denies frequency, dysuria, hematuria.  NEUROLOGIC: Denies syncope or weakness.   PSYCHIATRIC: Denies depression, anxiety or mood swings.    Physical Exam:  BP (!) 140/90   Ht 5' 4" (1.626 m)   Wt 78.1 kg (172 lb 2.9 oz)   LMP 2016   BMI 29.55 kg/m²     APPEARANCE: Well nourished, well developed, in no acute distress.  AFFECT: WNL, alert and oriented x 3  SKIN: No acne or hirsutism  BREASTS: Symmetrical, no skin changes.                     No nipple discharge. No palpable masses bilaterally  NODES: No inguinal nor axillary LAD  ABDOMEN: soft Non tender Non distended No masses  PELVIC:   Normal external genitalia without lesions.   Normal urethral meatus. " No signif cystocele or rectocele.  Vagina atrophic without lesions or discharge.  Cuff intact  Cervix absent  Adnexa without masses or tenderness.    EXTREMITIES: No edema.    ASSESSMENT AND PLAN  Reshma was seen today for annual exam.    Diagnoses and all orders for this visit:    Encounter for gynecological examination    Encounter for screening mammogram for breast cancer  -     Mammo Digital Screening Bilat w/ Andrew; Future          Patient was counseled today on A.C.S. Pap guidelines and recommendations for yearly pelvic exams, mammograms and monthly self breast exams; to see her PCP for other health maintenance.     Patient encouraged to register for portal and results will be sent via portal.    Follow up in about 1 year (around 5/16/2020).         Health Maintenance   Topic Date Due    Lipid Panel  1975    TETANUS VACCINE  10/10/1993    Pneumococcal Vaccine (Highest Risk) (1 of 3 - PCV13) 10/10/1994    Influenza Vaccine  08/01/2019    Mammogram  11/13/2019

## 2019-06-04 ENCOUNTER — PATIENT MESSAGE (OUTPATIENT)
Dept: OBSTETRICS AND GYNECOLOGY | Facility: CLINIC | Age: 44
End: 2019-06-04

## 2019-11-19 ENCOUNTER — APPOINTMENT (OUTPATIENT)
Dept: RADIOLOGY | Facility: OTHER | Age: 44
End: 2019-11-19
Attending: OBSTETRICS & GYNECOLOGY
Payer: COMMERCIAL

## 2019-11-19 VITALS — HEIGHT: 64 IN | WEIGHT: 172.19 LBS | BODY MASS INDEX: 29.4 KG/M2

## 2019-11-19 DIAGNOSIS — Z12.31 ENCOUNTER FOR SCREENING MAMMOGRAM FOR BREAST CANCER: ICD-10-CM

## 2019-11-19 PROCEDURE — 77063 MAMMO DIGITAL SCREENING BILAT WITH TOMOSYNTHESIS_CAD: ICD-10-PCS | Mod: 26,,, | Performed by: RADIOLOGY

## 2019-11-19 PROCEDURE — 77063 BREAST TOMOSYNTHESIS BI: CPT | Mod: 26,,, | Performed by: RADIOLOGY

## 2019-11-19 PROCEDURE — 77067 SCR MAMMO BI INCL CAD: CPT | Mod: TC,PN

## 2019-11-19 PROCEDURE — 77067 SCR MAMMO BI INCL CAD: CPT | Mod: 26,,, | Performed by: RADIOLOGY

## 2019-11-19 PROCEDURE — 77067 MAMMO DIGITAL SCREENING BILAT WITH TOMOSYNTHESIS_CAD: ICD-10-PCS | Mod: 26,,, | Performed by: RADIOLOGY

## 2020-02-26 RX ORDER — VALACYCLOVIR HYDROCHLORIDE 500 MG/1
500 TABLET, FILM COATED ORAL DAILY
Qty: 30 TABLET | Refills: 6 | Status: SHIPPED | OUTPATIENT
Start: 2020-02-26 | End: 2021-05-17 | Stop reason: SDUPTHER

## 2020-05-21 ENCOUNTER — OFFICE VISIT (OUTPATIENT)
Dept: OBSTETRICS AND GYNECOLOGY | Facility: CLINIC | Age: 45
End: 2020-05-21
Payer: COMMERCIAL

## 2020-05-21 VITALS
HEIGHT: 64 IN | SYSTOLIC BLOOD PRESSURE: 128 MMHG | TEMPERATURE: 98 F | DIASTOLIC BLOOD PRESSURE: 82 MMHG | BODY MASS INDEX: 28.24 KG/M2 | WEIGHT: 165.38 LBS

## 2020-05-21 DIAGNOSIS — Z01.419 ENCOUNTER FOR GYNECOLOGICAL EXAMINATION: Primary | ICD-10-CM

## 2020-05-21 DIAGNOSIS — Z12.31 BREAST CANCER SCREENING BY MAMMOGRAM: ICD-10-CM

## 2020-05-21 DIAGNOSIS — D24.9 BREAST FIBROADENOMA IN FEMALE, UNSPECIFIED LATERALITY: ICD-10-CM

## 2020-05-21 PROCEDURE — 99396 PR PREVENTIVE VISIT,EST,40-64: ICD-10-PCS | Mod: S$GLB,,, | Performed by: OBSTETRICS & GYNECOLOGY

## 2020-05-21 PROCEDURE — 99396 PREV VISIT EST AGE 40-64: CPT | Mod: S$GLB,,, | Performed by: OBSTETRICS & GYNECOLOGY

## 2020-05-21 PROCEDURE — 99999 PR PBB SHADOW E&M-EST. PATIENT-LVL IV: CPT | Mod: PBBFAC,,, | Performed by: OBSTETRICS & GYNECOLOGY

## 2020-05-21 PROCEDURE — 99999 PR PBB SHADOW E&M-EST. PATIENT-LVL IV: ICD-10-PCS | Mod: PBBFAC,,, | Performed by: OBSTETRICS & GYNECOLOGY

## 2020-05-21 NOTE — PROGRESS NOTES
Chief Complaint: well woman exam  Well Woman (Annual exam, Last mammo 2019 birads 1 [facility] (Tyrer-Magdalena 22%))      Reshma Rogers is a 44 y.o. female  presents for a well woman exam.    She is established.  No complaints. Plans to see Dr Mesa to remove breast implants. Had MRI with 2 fibroadenomas in right and left breast.  She ask Dr العراقي about removing fibroadenomas at same time but may need breast surgeon involvement/ desires their recs      LMP: Patient's last menstrual period was 2016..  .  Last pap: 2016 s/p DVH  Last MM2019 [facility] birads 1 [facility] TC 22%-           Medication List with Changes/Refills   Current Medications    FISH OIL-OMEGA-3 FATTY ACIDS 300-1,000 MG CAPSULE    Take 2 g by mouth once daily.    VALACYCLOVIR (VALTREX) 500 MG TABLET    Take 1 tablet (500 mg total) by mouth once daily.       Past Medical History:   Diagnosis Date    Abnormal Pap smear of cervix     prior had mild dysplasia treared with cryo ,nl since per pt     Anxiety     Anxiety and depression     Asthma     Breast disorder 2017    Left breast Lump    Herpes simplex virus (HSV) infection     Infertility, female     PCOS (polycystic ovarian syndrome)     Polycythemia     Thrombophilia     MTHFR T0671F/ L5606M    Thyroid disease     history of hyporthyroidism       Past Surgical History:   Procedure Laterality Date    AUGMENTATION OF BREAST      BREAST AUGMENTATION  2006    BREAST BIOPSY Left 2017    BREAST IMPLANT REMOVAL        SECTION      DILATION AND CURETTAGE OF UTERUS      GYNECOLOGIC CRYOSURGERY  2004    HYSTERECTOMY  2016    . Robotic DVH/LSO (Right ovary remains)     OOPHORECTOMY Left 2016    Right ovary remains     RHINOPLASTY TIP         OB History    Para Term  AB Living   4 3 2 1 1 1   SAB TAB Ectopic Multiple Live Births   1       1      # Outcome Date GA Lbr Dennis/2nd Weight Sex Delivery Anes PTL Lv   4  "  33w0d  2.07 kg (4 lb 9 oz) M CS-LTranv Spinal  RITA   3 SAB 2009 6w0d          2 Term            1 Term                Family History   Problem Relation Age of Onset    Deep vein thrombosis Father     Eclampsia Father     Breast cancer Maternal Grandmother     Cancer Maternal Grandmother     Diabetes Mother     Ovarian cancer Maternal Aunt     Colon cancer Neg Hx        Social History     Tobacco Use    Smoking status: Never Smoker    Smokeless tobacco: Never Used   Substance Use Topics    Alcohol use: Yes     Comment: Occational    Drug use: No         ROS:  GENERAL: Denies weight gain or weight loss. Feeling well overall.   SKIN: Denies rash or lesions.   HEENT: Denies headaches, or vision changes.   CARDIOVASCULAR: Denies palpitations or left sided chest pain.   RESPIRATORY: Denies shortness of breath or dyspnea on exertion.  BREASTS: Denies pain, lumps, or nipple discharge.   ABDOMEN: Denies abdominal pain, constipation, diarrhea, nausea, vomiting, change in appetite or rectal bleeding.   URINARY: Denies frequency, dysuria, hematuria.  NEUROLOGIC: Denies syncope or weakness.   PSYCHIATRIC: Denies depression, anxiety or mood swings.    Physical Exam:  /82   Temp 98.4 °F (36.9 °C)   Ht 5' 4" (1.626 m)   Wt 75 kg (165 lb 5.5 oz)   LMP 2016   BMI 28.38 kg/m²     APPEARANCE: Well nourished, well developed, in no acute distress.  AFFECT: WNL, alert and oriented x 3  SKIN: No acne or hirsutism  BREASTS: Symmetrical, no skin changes. No LAD                    No nipple discharge. Poss 1cm palp mass c/w MRI fibroadenoma in left breast but no masses appreciated in the right breast  NODES: No inguinal nor axillary LAD  ABDOMEN: soft Non tender Non distended No masses  PELVIC:   Normal external genitalia without lesions.  Normal hair distribution.   Adequate perineal body, normal urethral meatus. No signif cystocele or rectocele.  Vagina moist and well rugated without lesions or " discharge.    Cervix and uterus absent    Adnexa without masses or tenderness.    EXTREMITIES: No edema.    1/8/2020 MRI Findings:  The breasts have heterogeneous fibroglandular tissue. The background parenchymal enhancement is moderate and symmetric.    Left  There is a 15 mm oval mass with circumscribed margins and dark internal septation seen in the left breast at 1 o'clock. Compared to the previous study, there are no significant changes. This finding is a biopsy proven fibroadenoma.     Right  There is a 5 mm oval mass with circumscribed margins seen in the upper outer quadrant of the right breast. This finding has benign features and is also T2 hyperintense.  It either represents a benign lymph node or small fibroadenoma.     There are no dedicated sequences performed to evaluate for implant rupture however, there is no evidence of rupture on this exam.   Impression:  Bilateral  There is no MR evidence of malignancy.      ASSESSMENT AND PLAN    Reshma was seen today for well woman.    Diagnoses and all orders for this visit:    Encounter for gynecological examination    Breast cancer screening by mammogram  -     Mammo Digital Screening Bilat w/ Andrew; Future    Breast fibroadenoma in female, unspecified laterality  -     Ambulatory referral/consult to Breast Surgery; Future          Follow up in about 1 year (around 5/21/2021).    Patient was counseled today on A.C.S. Pap guidelines and recommendations for yearly pelvic exams, mammograms and monthly self breast exams; to see her PCP for other health maintenance.     Patient encouraged to register for portal and results will be sent via portal.       Health Maintenance   Topic Date Due    Lipid Panel  1975    TETANUS VACCINE  10/10/1993    Pneumococcal Vaccine (Highest Risk) (1 of 3 - PCV13) 10/10/1994    Mammogram  11/19/2020

## 2020-05-22 ENCOUNTER — PATIENT MESSAGE (OUTPATIENT)
Dept: SURGERY | Facility: CLINIC | Age: 45
End: 2020-05-22

## 2020-05-22 ENCOUNTER — TELEPHONE (OUTPATIENT)
Dept: SURGERY | Facility: CLINIC | Age: 45
End: 2020-05-22

## 2020-05-22 NOTE — TELEPHONE ENCOUNTER
Returned call to Ms.Michelle Rogers regarding a referral placed in the system by  [facility] for High Risk Breast Cancer due to Family Hx. Patient also with TC score of 22.45% . Please call Concetta suazo @[phone] as I would be happy to schedule this apt.

## 2020-05-25 ENCOUNTER — TELEPHONE (OUTPATIENT)
Dept: SURGERY | Facility: CLINIC | Age: 45
End: 2020-05-25

## 2020-05-25 NOTE — TELEPHONE ENCOUNTER
returned my call regarding referral placed by Dr.Robin Zhu GYN . Patient is High Risk TC 22.45%.  wanted patient seen by . Patient has been scheduled to see  on 08/03/20 @ 2:45 pm apt slip has been mailed out.

## 2020-08-03 ENCOUNTER — OFFICE VISIT (OUTPATIENT)
Dept: SURGERY | Facility: CLINIC | Age: 45
End: 2020-08-03
Payer: COMMERCIAL

## 2020-08-03 VITALS
WEIGHT: 165.25 LBS | BODY MASS INDEX: 28.21 KG/M2 | SYSTOLIC BLOOD PRESSURE: 146 MMHG | HEIGHT: 64 IN | HEART RATE: 87 BPM | DIASTOLIC BLOOD PRESSURE: 95 MMHG

## 2020-08-03 DIAGNOSIS — Z91.89 AT HIGH RISK FOR BREAST CANCER: Primary | ICD-10-CM

## 2020-08-03 DIAGNOSIS — Z12.31 SCREENING MAMMOGRAM, ENCOUNTER FOR: ICD-10-CM

## 2020-08-03 DIAGNOSIS — D24.9 BREAST FIBROADENOMA IN FEMALE, UNSPECIFIED LATERALITY: ICD-10-CM

## 2020-08-03 DIAGNOSIS — N63.20 LEFT BREAST MASS: ICD-10-CM

## 2020-08-03 PROCEDURE — 99204 PR OFFICE/OUTPT VISIT, NEW, LEVL IV, 45-59 MIN: ICD-10-PCS | Mod: S$GLB,,, | Performed by: SURGERY

## 2020-08-03 PROCEDURE — 3008F BODY MASS INDEX DOCD: CPT | Mod: CPTII,S$GLB,, | Performed by: SURGERY

## 2020-08-03 PROCEDURE — 99999 PR PBB SHADOW E&M-EST. PATIENT-LVL IV: ICD-10-PCS | Mod: PBBFAC,,, | Performed by: SURGERY

## 2020-08-03 PROCEDURE — 3008F PR BODY MASS INDEX (BMI) DOCUMENTED: ICD-10-PCS | Mod: CPTII,S$GLB,, | Performed by: SURGERY

## 2020-08-03 PROCEDURE — 99999 PR PBB SHADOW E&M-EST. PATIENT-LVL IV: CPT | Mod: PBBFAC,,, | Performed by: SURGERY

## 2020-08-03 PROCEDURE — 99204 OFFICE O/P NEW MOD 45 MIN: CPT | Mod: S$GLB,,, | Performed by: SURGERY

## 2020-08-03 NOTE — LETTER
August 17, 2020      Guero Zhu MD  [address]  [address]  [facility] [address]           [facility] Breast Surgery  [address]  [facility] [address]  Phone: [phone]  Fax: [phone]          Patient: Reshma Rogers   MR Number: [id number]   YOB: 1975   Date of Visit: 8/3/2020       Dear Dr. Guero Zhu:    Thank you for referring Reshma Rogers to me for evaluation. Attached you will find relevant portions of my assessment and plan of care.    If you have questions, please do not hesitate to call me. I look forward to following Reshma Rogers along with you.    Sincerely,    Abbey Shaikh MD    Enclosure  CC:  No Recipients    If you would like to receive this communication electronically, please contact [email or web address] or [phone] to request more information on [employer] Link access.    For providers and/or their staff who would like to refer a patient to [facility], please contact us through our one-stop-shop provider referral line, [facility], at [phone].    If you feel you have received this communication in error or would no longer like to receive these types of communications, please e-mail [email or web address]

## 2020-08-03 NOTE — PROGRESS NOTES
BREAST HIGH RISK CLINIC  [facility]  Breast Surgery      Date of Visit:  2020    Referring provider:  Guero Zhu MD  [address]  [address]  [place] [address]    PCP:  Primary Doctor No    HIGH RISK    Reshma Rogers is a 44 y.o. postmenopausal female referred for evaluation of increased risk of breast cancer based on family history. Latest MMG Tyrer-Cuzick score of 22.45 % Here today to discussed options of high risk screening and risk reduction.    Also with left breast mass--biopsy proven fibroadenoma.    Other breast cancer risk factors include family hx on mother's side and family hx of ovarian CA.     Age of menarche was 14.  Hysterectomy with one ovary removed at age 41.  Last menstrual period was at age 41.  Patient denies hormonal therapy.     Patient is .  Age of first live birth was 35.  Patient did breast feed.       Family History is significant for the following:  Maternal grandmother with breast  Maternal aunt with breast cancer  Maternal great aunt with ovarian cancer  Mother with unknown breast lesion.    Social History:   Smoking:  Never  Drinking:  Occasionally     The following portions of the patient's history were reviewed and updated as appropriate: allergies, current medications, past family history, past medical history, past social history, past surgical history and problem list.    Review of Systems  Pertinent items are noted in HPI.      Objective:   LMP 2016     General: NAD  Chest: nonlabored breathing, no obvious deformity  Heart: regular rate  Abdomen: soft, nondistended  Extremities: no edema noted  Breasts: breasts appear normal, no suspicious masses, no skin or nipple changes or axillary nodes. Known mass left breast 1OC.  Consistent with fibroadenoma.    Imaging  Mammograms:   19  Findings:  Computer-aided detection was utilized in the interpretation of this examination. This procedure was performed using tomosynthesis.        The breasts are heterogeneously dense, which may obscure small masses. There is no evidence of suspicious masses, microcalcifications or architectural distortion.     There are bilateral breast implants.     Impression:  No mammographic evidence of malignancy.     BI-RADS Category 1: Negative     Assessment:     This is a 44 y.o. female with an increased risk of breast cancer based on family history.        Plan:   Discussed risk models can vary based on the model used.  There are several models available and we currently use TC model for our patients with family history.  Based on this, the patient's risk exceeds 20%.  Patients with lifetime risk over 20% qualify for increased screening with MRI, in addition to mammograms.  We also would perform breast exams every 6 months.  Discussed pros and cons of MRI screening.    We also discussed risk reduction options. Discussed that we recommend a healthy lifestyle.      Nutrition we recommend fresh fruits and vegetables and lean meats and avoidance of processed foods.    Exercise I recommend at least 30 minutes of cardiovascular exercise 4-5 times per week, even walking would have benefit.  Discussed that exercise can lower the relative risk of breast cancer by about 18-20%.  Also discussed that obesity is linked to higher risk of breast cancer, therefore exercise in important.    Discussed alcohol use and some studies suggest that increase alcohol intake may increase breast cancer risk.  Therefore, I do recommend limited alcohol intake.    Also discussed risk factors that are not modifiable, such as age at menarche, age at menopause, age at first pregnancy, and family history.     We did discuss hormone replacement therapy as well.  In patients at increased risk, I usually do not recommend HRT for long periods of time.      Discussed briefly risk reduction options with chemoprevention, such as Tamoxifen or Raloxifene.  These have been shown to lower the risk of breast  cancer incidence, however have not been shown to improve survival in patients who do not have a breast cancer.    I will see the patient back in 6 months with a MRI.  Will get ultrasound for stability of fibroadenoma

## 2020-12-02 ENCOUNTER — APPOINTMENT (OUTPATIENT)
Dept: RADIOLOGY | Facility: OTHER | Age: 45
End: 2020-12-02
Attending: OBSTETRICS & GYNECOLOGY
Payer: COMMERCIAL

## 2020-12-02 VITALS — WEIGHT: 165.38 LBS | BODY MASS INDEX: 28.24 KG/M2 | HEIGHT: 64 IN

## 2020-12-02 DIAGNOSIS — Z12.31 BREAST CANCER SCREENING BY MAMMOGRAM: ICD-10-CM

## 2020-12-02 PROCEDURE — 77067 MAMMO DIGITAL SCREENING BILAT WITH TOMOSYNTHESIS_CAD: ICD-10-PCS | Mod: 26,,, | Performed by: RADIOLOGY

## 2020-12-02 PROCEDURE — 77063 MAMMO DIGITAL SCREENING BILAT WITH TOMOSYNTHESIS_CAD: ICD-10-PCS | Mod: 26,,, | Performed by: RADIOLOGY

## 2020-12-02 PROCEDURE — 77067 SCR MAMMO BI INCL CAD: CPT | Mod: TC,PN

## 2020-12-02 PROCEDURE — 77063 BREAST TOMOSYNTHESIS BI: CPT | Mod: 26,,, | Performed by: RADIOLOGY

## 2020-12-02 PROCEDURE — 77067 SCR MAMMO BI INCL CAD: CPT | Mod: 26,,, | Performed by: RADIOLOGY

## 2020-12-03 NOTE — PROGRESS NOTES
Just wanted to let you know that I got your mammogram results back and the radiologist reading is perfectly normal. Let me know if you have any questions or concerns  Hope you have a great day!  Dr Zhu

## 2021-05-14 RX ORDER — VALACYCLOVIR HYDROCHLORIDE 500 MG/1
500 TABLET, FILM COATED ORAL 2 TIMES DAILY
Qty: 60 TABLET | Refills: 0 | Status: SHIPPED | OUTPATIENT
Start: 2021-05-14 | End: 2021-05-17

## 2021-05-17 ENCOUNTER — OFFICE VISIT (OUTPATIENT)
Dept: OBSTETRICS AND GYNECOLOGY | Facility: CLINIC | Age: 46
End: 2021-05-17
Attending: OBSTETRICS & GYNECOLOGY
Payer: COMMERCIAL

## 2021-05-17 VITALS
HEIGHT: 64 IN | WEIGHT: 167.56 LBS | SYSTOLIC BLOOD PRESSURE: 126 MMHG | DIASTOLIC BLOOD PRESSURE: 84 MMHG | BODY MASS INDEX: 28.6 KG/M2

## 2021-05-17 DIAGNOSIS — Z12.31 BREAST CANCER SCREENING BY MAMMOGRAM: ICD-10-CM

## 2021-05-17 DIAGNOSIS — Z01.419 ENCOUNTER FOR GYNECOLOGICAL EXAMINATION: Primary | ICD-10-CM

## 2021-05-17 PROCEDURE — 99396 PR PREVENTIVE VISIT,EST,40-64: ICD-10-PCS | Mod: S$GLB,,, | Performed by: OBSTETRICS & GYNECOLOGY

## 2021-05-17 PROCEDURE — 1126F PR PAIN SEVERITY QUANTIFIED, NO PAIN PRESENT: ICD-10-PCS | Mod: S$GLB,,, | Performed by: OBSTETRICS & GYNECOLOGY

## 2021-05-17 PROCEDURE — 99999 PR PBB SHADOW E&M-EST. PATIENT-LVL III: CPT | Mod: PBBFAC,,, | Performed by: OBSTETRICS & GYNECOLOGY

## 2021-05-17 PROCEDURE — 3008F PR BODY MASS INDEX (BMI) DOCUMENTED: ICD-10-PCS | Mod: CPTII,S$GLB,, | Performed by: OBSTETRICS & GYNECOLOGY

## 2021-05-17 PROCEDURE — 99999 PR PBB SHADOW E&M-EST. PATIENT-LVL III: ICD-10-PCS | Mod: PBBFAC,,, | Performed by: OBSTETRICS & GYNECOLOGY

## 2021-05-17 PROCEDURE — 1126F AMNT PAIN NOTED NONE PRSNT: CPT | Mod: S$GLB,,, | Performed by: OBSTETRICS & GYNECOLOGY

## 2021-05-17 PROCEDURE — 99396 PREV VISIT EST AGE 40-64: CPT | Mod: S$GLB,,, | Performed by: OBSTETRICS & GYNECOLOGY

## 2021-05-17 PROCEDURE — 3008F BODY MASS INDEX DOCD: CPT | Mod: CPTII,S$GLB,, | Performed by: OBSTETRICS & GYNECOLOGY

## 2021-05-17 RX ORDER — TRAMADOL HYDROCHLORIDE 50 MG/1
50 TABLET ORAL
COMMUNITY
Start: 2020-10-23 | End: 2021-05-17

## 2021-05-17 RX ORDER — VALACYCLOVIR HYDROCHLORIDE 500 MG/1
500 TABLET, FILM COATED ORAL 2 TIMES DAILY
Qty: 60 TABLET | Refills: 11 | Status: SHIPPED | OUTPATIENT
Start: 2021-05-17 | End: 2023-06-27 | Stop reason: SDUPTHER

## 2022-02-08 NOTE — PROGRESS NOTES
Outpatient Physical Therapy Vestibular Treatment Note  [facility]     Patient Name: Rosi Vicente  : 1949  MRN: [id number]  Today's Date: 2022      Visit Date: 2022    Visit Dx:     ICD-10-CM ICD-9-CM   1. Vertigo  R42 780.4   2. History of lacunar cerebrovascular accident (CVA)  Z86.73 V12.54   3. Balance problem  R26.89 781.99       Patient Active Problem List   Diagnosis   • Hypertension   • Allergic rhinitis   • Hyperlipidemia LDL goal <70   • Osteoarthritis of hip   • Vitamin D deficiency   • History of CVA (cerebrovascular accident)   • Heartburn   • Frequent urination   • Age-related osteoporosis without current pathological fracture   • Chronic fatigue   • Hyponatremia   • Overactive bladder   • Vision disturbance   • Sequelae, post-stroke   • Dry eye syndrome of both eyes   • Snoring   • Obstructive sleep apnea syndrome   • Irritable bowel syndrome with both constipation and diarrhea   • Dizziness   • Abnormal finding on breast imaging   • History of removal of implants of both breasts   • Fibrocystic breast changes, bilateral                        PT Assessment/Plan     Row Name 22 1047          PT Assessment    Assessment Comments Rosi returns for first follow up from initial evaluation, pt. reports she has continued to feel generally off balance and has difficulty with SLS on HEP, denies any falls. Progressed toward higher level balance challenges in multiple planes and varying ANDRE/surfaces which was met with expected instability. Encouraged pt. to begin use of metronome during VOR exercise with HEP with pt. verbalizing understanding. Pt will continue to benefit from skilled PT.  -            PT Plan    PT Plan Comments use metronome, consider fitter board?  -           User Key  (r) = Recorded By, (t) = Taken By, (c) = Cosigned By    Initials Name Provider Type    Dionne Crockett, PT Physical Therapist                    OP Exercises     Row Name 22 5890     Subjective:       Patient ID: Reshma Rogers is a 41 y.o. female.    Chief Complaint:  Follow-up (francisco martin still has r ovary  lastpa  neg hpv neg.Last mamm  norm)      History of Present Illness  Patient status post DVH in here for follow-up of vaginal cuff granulation tissue.  Last visit was treated with silver nitrate and here today for follow-up.      GYN & OB History  Patient's last menstrual period was 2016 (exact date).   Date of Last Pap: 2016    OB History    Para Term  AB Living   2 1   1 1 1   SAB TAB Ectopic Multiple Live Births   1       1      # Outcome Date GA Lbr Dennis/2nd Weight Sex Delivery Anes PTL Lv   2   33w0d  2.07 kg (4 lb 9 oz) M CS-LTranv Spinal  RITA   1 SAB  6w0d                 Review of Systems  Review of Systems   Constitutional: Negative.    HENT: Negative.    Respiratory: Negative.    Cardiovascular: Negative.    Gastrointestinal: Negative.    Genitourinary: Negative.         Objective:   Physical Exam:   Constitutional: She appears well-developed and well-nourished.               Genitourinary: Vagina normal. No vaginal discharge found.   Genitourinary Comments: Vaginal cuff totally healed.  No granulation tissue present today.  No sutures seen at the vaginal cuff.  No Adnexal masses palpable.                      Assessment/ Plan:          Reshma was seen today for follow-up.    Diagnoses and all orders for this visit:    Granulation tissue of vaginal cuff   Granulation tissue has resolved.      Return in about 4 months (around 2017) for annual.      Health Maintenance       Date Due Completion Date    Lipid Panel 1975 ---    Pneumococcal PCV13 (High Risk) (1 - PCV13 Required) 10/10/1976 ---    TETANUS VACCINE 10/10/1993 ---    Pneumococcal PPSV23 (High Risk) (1) 10/10/1993 ---    Influenza Vaccine 2017 ---    Mammogram 2018                  Subjective Comments    Subjective Comments The single leg exercise is just really hard for me  -MH              Total Minutes    70996 - PT Therapeutic Exercise Minutes 10  -MH      87151 -  PT Neuromuscular Reeducation Minutes 30  -MH              Exercise 1    Exercise Name 1 heel raises  -MH      Cueing 1 Verbal; Demo  -MH      Sets 1 2  -MH      Reps 1 20  -MH              Exercise 2    Exercise Name 2 toe raises  -MH      Cueing 2 Verbal; Demo  -MH      Sets 2 2  -MH      Reps 2 20  -MH              Exercise 3    Exercise Name 3 SLS B  -MH      Cueing 3 Verbal; Demo  -MH      Reps 3 3e  -MH      Time 3 30sec  -MH      Additional Comments fingertip support  -MH              Exercise 4    Exercise Name 4 tandem stance + VOR yaw/pitch  -MH      Cueing 4 Verbal; Demo  -MH      Reps 4 3e  -MH      Time 4 30sec  -MH      Additional Comments discussed use of metronome at home with HEP  -MH              Exercise 5    Exercise Name 5 lateral step + reach  -MH      Cueing 5 Verbal; Demo  -      Sets 5 2  -MH      Reps 5 10e  -MH      Time 5 first set all 10 then switch, second set alternating  -              Exercise 6    Exercise Name 6 tandem + trunk rotation  -      Cueing 6 Verbal; Demo  -      Reps 6 10e  -MH      Time 6 30 degree rotation  -MH      Additional Comments L2 med ball  -              Exercise 7    Exercise Name 7 NuStep  -      Cueing 7 Verbal; Demo  -      Time 7 5min  -MH      Additional Comments UE/LE  -MH              Exercise 8    Exercise Name 8 tandem stance w/ airex  -MH      Cueing 8 Verbal; Demo  -      Reps 8 3e  -MH      Time 8 30sec  -MH      Additional Comments EC  -MH              Exercise 9    Exercise Name 9 tandem + D2  -MH      Cueing 9 Verbal; Demo  -      Reps 9 10e  -MH      Time 9 L2 med ball  -      Additional Comments front foot on airex  -MH              Exercise 10    Exercise Name 10 tandem + rebounder  -MH      Cueing 10 Verbal; Demo  -       Reps 10 10 throws L2  -            User Key  (r) = Recorded By, (t) = Taken By, (c) = Cosigned By    Initials Name Provider Type     Dionne Bagley, PT Physical Therapist                             PT OP Goals     Row Name 02/08/22 1000          PT Short Term Goals    STG Date to Achieve 02/13/22  -     STG 1 Pt. Will be independent with initial HEP to improve self-management of condition.  -     STG 1 Progress Ongoing  -     STG 2 Pt. Will ambulate with good heel strike and improved eccentric control to foot flat to improve gait mechanics and safety.  -     STG 2 Progress Ongoing  -            Long Term Goals    LTG Date to Achieve 03/14/22  -     LTG 1 Pt. Will be independent with advanced HEP to improve long-term management of condition and independence.  -     LTG 1 Progress Ongoing  -     LTG 2 Pt. Will improve DGI score to >/= 19/24 to indicate reduced falls risk and improved safety.  -     LTG 2 Progress Ongoing  -     LTG 3 Pt. will report 0 falls since initiating to improve overall safety and independence.  -     LTG 3 Progress Ongoing  -     LTG 4 Pt. will improve L foot DF MMT to 5/5 to reduce foot drop during gait.  -     LTG 4 Progress Ongoing  -     LTG 5 Pt. will report improved confidence with lateral movements in yoga to improve participation in recreational activities.  -     LTG 5 Progress Ongoing  -           User Key  (r) = Recorded By, (t) = Taken By, (c) = Cosigned By    Initials Name Provider Type    Dionne Crockett, CRUZITO Physical Therapist                Therapy Education  Education Details: Updated HEP via [employer] Interactive Access Code: [email or web address]  Given: HEP, Symptoms/condition management, Posture/body mechanics  Program: Reinforced, Progressed  How Provided: Verbal, Demonstration (given via [employer] update)  Provided to: Patient  Level of Understanding: Verbalized, Demonstrated              Time Calculation:   Start Time: 1002  Stop Time: 1045  Time  Calculation (min): 43 min  Total Timed Code Minutes- PT: 40 minute(s)  Timed Charges  52933 - PT Therapeutic Exercise Minutes: 10  48784 -  PT Neuromuscular Reeducation Minutes: 30  Total Minutes  Timed Charges Total Minutes: 40   Total Minutes: 40   Therapy Charges for Today     Code Description Service Date Service Provider Modifiers Qty    35220270258  PT THER PROC EA 15 MIN 2/8/2022 Dionne Bagley, PT GP 1    33151602173 HC PT NEUROMUSC RE EDUCATION EA 15 MIN 2/8/2022 Dionne Bagley, PT GP 2                   Dionne Bagley PT  2/8/2022

## 2022-03-17 ENCOUNTER — OFFICE VISIT (OUTPATIENT)
Dept: FAMILY MEDICINE | Facility: CLINIC | Age: 47
End: 2022-03-17
Payer: COMMERCIAL

## 2022-03-17 DIAGNOSIS — K12.2 UVULITIS: Primary | ICD-10-CM

## 2022-03-17 PROCEDURE — 1160F RVW MEDS BY RX/DR IN RCRD: CPT | Mod: CPTII,95,, | Performed by: FAMILY MEDICINE

## 2022-03-17 PROCEDURE — 1159F PR MEDICATION LIST DOCUMENTED IN MEDICAL RECORD: ICD-10-PCS | Mod: CPTII,95,, | Performed by: FAMILY MEDICINE

## 2022-03-17 PROCEDURE — 99202 OFFICE O/P NEW SF 15 MIN: CPT | Mod: 95,,, | Performed by: FAMILY MEDICINE

## 2022-03-17 PROCEDURE — 99202 PR OFFICE/OUTPT VISIT, NEW, LEVL II, 15-29 MIN: ICD-10-PCS | Mod: 95,,, | Performed by: FAMILY MEDICINE

## 2022-03-17 PROCEDURE — 1159F MED LIST DOCD IN RCRD: CPT | Mod: CPTII,95,, | Performed by: FAMILY MEDICINE

## 2022-03-17 PROCEDURE — 1160F PR REVIEW ALL MEDS BY PRESCRIBER/CLIN PHARMACIST DOCUMENTED: ICD-10-PCS | Mod: CPTII,95,, | Performed by: FAMILY MEDICINE

## 2022-03-17 RX ORDER — AMOXICILLIN AND CLAVULANATE POTASSIUM 875; 125 MG/1; MG/1
1 TABLET, FILM COATED ORAL 2 TIMES DAILY
Qty: 14 TABLET | Refills: 0 | Status: SHIPPED | OUTPATIENT
Start: 2022-03-17 | End: 2022-03-24

## 2022-03-17 NOTE — PROGRESS NOTES
Subjective:       Patient ID: Reshma Rogers is a 46 y.o. female.    Chief Complaint: Mouth Lesions    HPI     The patient is a 46-year-old who is seen virtually today with oral ulcers.  This morning when she woke up, she could tell that something was not right.  She looked in her throat and saw that her uvula had a large ulcer on it and that the left side of her throat also had a large ulcer on it.  She is having mild pain from this.  She denies any trouble swallowing or breathing.  She has had no fevers or chills.  She denies any sinus congestion, rhinorrhea, postnasal drainage or swollen lymph nodes.      Review of Systems   Constitutional: Negative for appetite change, chills, diaphoresis, fatigue, fever and unexpected weight change.   HENT: Positive for sore throat. Negative for congestion, drooling, ear discharge, ear pain, postnasal drip, rhinorrhea, sinus pressure, sneezing and trouble swallowing.    Eyes: Negative for pain, discharge and visual disturbance.   Respiratory: Negative for cough, chest tightness, shortness of breath, wheezing and stridor.    Cardiovascular: Negative for chest pain, palpitations and leg swelling.   Gastrointestinal: Negative for abdominal distention, abdominal pain, blood in stool, constipation, diarrhea, nausea and vomiting.   Musculoskeletal: Negative for neck pain.   Skin: Negative for rash.   Neurological: Positive for headaches.       Objective:      Physical Exam  Constitutional:       General: She is not in acute distress.     Appearance: Normal appearance.   Neurological:      Mental Status: She is alert.   Psychiatric:         Attention and Perception: Attention and perception normal.         Mood and Affect: Mood and affect normal.         Speech: Speech normal.         Behavior: Behavior normal. Behavior is cooperative.         Thought Content: Thought content normal.         Cognition and Memory: Cognition and memory normal.         Judgment: Judgment  normal.       Last menstrual period 12/03/2016.There is no height or weight on file to calculate BMI.        A/P:  1) uvulitis.  Acute.  I am going to treat her with a course of Augmentin to cover for H flu.  If her symptoms do not improve or if they worsen, she will let me know.  We did discuss magic mouthwash but she did not think that is necessary at this time.      The patient location is: home  The chief complaint leading to consultation is Mouth Lesions     Visit type: Virtual visit with synchronous audio and video    Each patient to whom he or she provides medical services by telemedicine is:  (1) informed of the relationship between the physician and patient and the respective role of any other health care provider with respect to management of the patient; and (2) notified that he or she may decline to receive medical services by telemedicine and may withdraw from such care at any time.    I spent 15 minutes on this encounter.  This time includes face-to-face time and non-face to face time including previsit chart review, obtaining and/or reviewing separately obtained history, documenting clinical information in the electronic or other health record, independently interpreting results and communicating results to the patient/family/caregiver, or care coordinator.

## 2022-03-21 ENCOUNTER — OFFICE VISIT (OUTPATIENT)
Dept: FAMILY MEDICINE | Facility: CLINIC | Age: 47
End: 2022-03-21
Payer: COMMERCIAL

## 2022-03-21 VITALS
TEMPERATURE: 98 F | RESPIRATION RATE: 16 BRPM | HEIGHT: 64 IN | HEART RATE: 96 BPM | WEIGHT: 165.44 LBS | SYSTOLIC BLOOD PRESSURE: 130 MMHG | BODY MASS INDEX: 28.24 KG/M2 | DIASTOLIC BLOOD PRESSURE: 86 MMHG | OXYGEN SATURATION: 99 %

## 2022-03-21 DIAGNOSIS — K12.2 UVULITIS: Primary | ICD-10-CM

## 2022-03-21 DIAGNOSIS — K13.79 UVULAR SWELLING: ICD-10-CM

## 2022-03-21 PROCEDURE — 99214 PR OFFICE/OUTPT VISIT, EST, LEVL IV, 30-39 MIN: ICD-10-PCS | Mod: S$GLB,,, | Performed by: NURSE PRACTITIONER

## 2022-03-21 PROCEDURE — 3008F PR BODY MASS INDEX (BMI) DOCUMENTED: ICD-10-PCS | Mod: CPTII,S$GLB,, | Performed by: NURSE PRACTITIONER

## 2022-03-21 PROCEDURE — 1159F MED LIST DOCD IN RCRD: CPT | Mod: CPTII,S$GLB,, | Performed by: NURSE PRACTITIONER

## 2022-03-21 PROCEDURE — 99214 OFFICE O/P EST MOD 30 MIN: CPT | Mod: S$GLB,,, | Performed by: NURSE PRACTITIONER

## 2022-03-21 PROCEDURE — 3079F DIAST BP 80-89 MM HG: CPT | Mod: CPTII,S$GLB,, | Performed by: NURSE PRACTITIONER

## 2022-03-21 PROCEDURE — 3075F PR MOST RECENT SYSTOLIC BLOOD PRESS GE 130-139MM HG: ICD-10-PCS | Mod: CPTII,S$GLB,, | Performed by: NURSE PRACTITIONER

## 2022-03-21 PROCEDURE — 3075F SYST BP GE 130 - 139MM HG: CPT | Mod: CPTII,S$GLB,, | Performed by: NURSE PRACTITIONER

## 2022-03-21 PROCEDURE — 3079F PR MOST RECENT DIASTOLIC BLOOD PRESSURE 80-89 MM HG: ICD-10-PCS | Mod: CPTII,S$GLB,, | Performed by: NURSE PRACTITIONER

## 2022-03-21 PROCEDURE — 3008F BODY MASS INDEX DOCD: CPT | Mod: CPTII,S$GLB,, | Performed by: NURSE PRACTITIONER

## 2022-03-21 PROCEDURE — 1159F PR MEDICATION LIST DOCUMENTED IN MEDICAL RECORD: ICD-10-PCS | Mod: CPTII,S$GLB,, | Performed by: NURSE PRACTITIONER

## 2022-03-21 RX ORDER — PREDNISONE 20 MG/1
40 TABLET ORAL DAILY
Qty: 6 TABLET | Refills: 0 | Status: SHIPPED | OUTPATIENT
Start: 2022-03-21 | End: 2022-03-24

## 2022-03-21 RX ORDER — SULFAMETHOXAZOLE AND TRIMETHOPRIM 800; 160 MG/1; MG/1
1 TABLET ORAL 2 TIMES DAILY
Qty: 14 TABLET | Refills: 0 | Status: SHIPPED | OUTPATIENT
Start: 2022-03-21 | End: 2022-03-28

## 2022-03-28 ENCOUNTER — PATIENT MESSAGE (OUTPATIENT)
Dept: ADMINISTRATIVE | Facility: HOSPITAL | Age: 47
End: 2022-03-28
Payer: COMMERCIAL

## 2022-04-17 ENCOUNTER — PATIENT MESSAGE (OUTPATIENT)
Dept: FAMILY MEDICINE | Facility: CLINIC | Age: 47
End: 2022-04-17
Payer: COMMERCIAL

## 2022-05-16 ENCOUNTER — OFFICE VISIT (OUTPATIENT)
Dept: OBSTETRICS AND GYNECOLOGY | Facility: CLINIC | Age: 47
End: 2022-05-16
Attending: OBSTETRICS & GYNECOLOGY
Payer: COMMERCIAL

## 2022-05-16 ENCOUNTER — APPOINTMENT (OUTPATIENT)
Dept: RADIOLOGY | Facility: OTHER | Age: 47
End: 2022-05-16
Attending: OBSTETRICS & GYNECOLOGY
Payer: COMMERCIAL

## 2022-05-16 VITALS
DIASTOLIC BLOOD PRESSURE: 100 MMHG | WEIGHT: 170.44 LBS | HEIGHT: 64 IN | BODY MASS INDEX: 29.1 KG/M2 | SYSTOLIC BLOOD PRESSURE: 142 MMHG

## 2022-05-16 DIAGNOSIS — Z12.31 BREAST CANCER SCREENING BY MAMMOGRAM: ICD-10-CM

## 2022-05-16 DIAGNOSIS — Z01.419 ENCOUNTER FOR GYNECOLOGICAL EXAMINATION: Primary | ICD-10-CM

## 2022-05-16 PROCEDURE — 77063 MAMMO DIGITAL SCREENING BILAT WITH TOMO: ICD-10-PCS | Mod: 26,,, | Performed by: RADIOLOGY

## 2022-05-16 PROCEDURE — 77067 SCR MAMMO BI INCL CAD: CPT | Mod: 26,,, | Performed by: RADIOLOGY

## 2022-05-16 PROCEDURE — 99999 PR PBB SHADOW E&M-EST. PATIENT-LVL III: ICD-10-PCS | Mod: PBBFAC,,, | Performed by: OBSTETRICS & GYNECOLOGY

## 2022-05-16 PROCEDURE — 99999 PR PBB SHADOW E&M-EST. PATIENT-LVL III: CPT | Mod: PBBFAC,,, | Performed by: OBSTETRICS & GYNECOLOGY

## 2022-05-16 PROCEDURE — 3008F PR BODY MASS INDEX (BMI) DOCUMENTED: ICD-10-PCS | Mod: CPTII,S$GLB,, | Performed by: OBSTETRICS & GYNECOLOGY

## 2022-05-16 PROCEDURE — 1159F PR MEDICATION LIST DOCUMENTED IN MEDICAL RECORD: ICD-10-PCS | Mod: CPTII,S$GLB,, | Performed by: OBSTETRICS & GYNECOLOGY

## 2022-05-16 PROCEDURE — 3080F PR MOST RECENT DIASTOLIC BLOOD PRESSURE >= 90 MM HG: ICD-10-PCS | Mod: CPTII,S$GLB,, | Performed by: OBSTETRICS & GYNECOLOGY

## 2022-05-16 PROCEDURE — 77063 BREAST TOMOSYNTHESIS BI: CPT | Mod: 26,,, | Performed by: RADIOLOGY

## 2022-05-16 PROCEDURE — 3080F DIAST BP >= 90 MM HG: CPT | Mod: CPTII,S$GLB,, | Performed by: OBSTETRICS & GYNECOLOGY

## 2022-05-16 PROCEDURE — 1159F MED LIST DOCD IN RCRD: CPT | Mod: CPTII,S$GLB,, | Performed by: OBSTETRICS & GYNECOLOGY

## 2022-05-16 PROCEDURE — 3077F PR MOST RECENT SYSTOLIC BLOOD PRESSURE >= 140 MM HG: ICD-10-PCS | Mod: CPTII,S$GLB,, | Performed by: OBSTETRICS & GYNECOLOGY

## 2022-05-16 PROCEDURE — 99396 PREV VISIT EST AGE 40-64: CPT | Mod: S$GLB,,, | Performed by: OBSTETRICS & GYNECOLOGY

## 2022-05-16 PROCEDURE — 77067 SCR MAMMO BI INCL CAD: CPT | Mod: TC,PN

## 2022-05-16 PROCEDURE — 99396 PR PREVENTIVE VISIT,EST,40-64: ICD-10-PCS | Mod: S$GLB,,, | Performed by: OBSTETRICS & GYNECOLOGY

## 2022-05-16 PROCEDURE — 3077F SYST BP >= 140 MM HG: CPT | Mod: CPTII,S$GLB,, | Performed by: OBSTETRICS & GYNECOLOGY

## 2022-05-16 PROCEDURE — 3008F BODY MASS INDEX DOCD: CPT | Mod: CPTII,S$GLB,, | Performed by: OBSTETRICS & GYNECOLOGY

## 2022-05-16 PROCEDURE — 77067 MAMMO DIGITAL SCREENING BILAT WITH TOMO: ICD-10-PCS | Mod: 26,,, | Performed by: RADIOLOGY

## 2022-05-16 RX ORDER — TRETINOIN 0.5 MG/G
CREAM TOPICAL
COMMUNITY
Start: 2021-11-18

## 2022-05-16 NOTE — PROGRESS NOTES
Chief Complaint Well woman exam:  Well Woman (Ouqo5vs Last pap 16 neg hpv neg  , mammo 20 birads 2)    She is established.     Reshma Rogers is a 46 y.o. female  presents for a well woman exam.    S/p DAVINCI HYSTERECTOMY and LSO  No c/o     ROS: Not signif flashes or sweeats  No abdominal pain No vaginal bleeding or discharge  No breast pain or masses, No rectal bleeding     LMP: none  S/p Hyst  Last pap: s/p Hyst No pap needed   Last MM2020 Birads 2 Facility [facility]      Past Medical History:   Diagnosis Date    Abnormal Pap smear of cervix     prior had mild dysplasia treared with cryo ,nl since per pt     Anxiety     Anxiety and depression     Asthma     Breast disorder 2017    Left breast Lump    Herpes simplex virus (HSV) infection     Infertility, female     PCOS (polycystic ovarian syndrome)     Polycythemia     Thrombophilia     MTHFR [id number]/ L3228D    Thyroid disease     history of hyporthyroidism       Past Surgical History:   Procedure Laterality Date    AUGMENTATION OF BREAST      BREAST AUGMENTATION  2006    BREAST BIOPSY Left 2017    BREAST IMPLANT REMOVAL        SECTION      DILATION AND CURETTAGE OF UTERUS      GYNECOLOGIC CRYOSURGERY  2004    HYSTERECTOMY  2016    . Robotic DVH/LSO (Right ovary remains)     OOPHORECTOMY Left 2016    Right ovary remains     RHINOPLASTY TIP         OB History    Para Term  AB Living   4 3 2 1 1 1   SAB IAB Ectopic Multiple Live Births   1       1      # Outcome Date GA Lbr Dennis/2nd Weight Sex Delivery Anes PTL Lv   4   33w0d  2.07 kg (4 lb 9 oz) M CS-LTranv Spinal  RITA   3 SAB  6w0d          2 Term            1 Term                Family History   Problem Relation Age of Onset    Deep vein thrombosis Father     Eclampsia Father     Hypertension Father     Breast cancer Maternal Grandmother     Cancer Maternal Grandmother     Diabetes Mother   "   Ovarian cancer Maternal Aunt     Colon cancer Neg Hx        Social History     Tobacco Use    Smoking status: Never Smoker    Smokeless tobacco: Never Used   Substance Use Topics    Alcohol use: Yes     Comment: Occational    Drug use: No         Physical Exam:  BP (!) 142/100   Ht 5' 4" (1.626 m)   Wt 77.3 kg (170 lb 6.7 oz)   LMP 12/03/2016   BMI 29.25 kg/m²     APPEARANCE: Well nourished, well developed, in no acute distress.  AFFECT: WNL, alert and oriented x 3  SKIN: No acne or hirsutism  BREASTS: Symmetrical, no skin changes.                      No nipple discharge.   No palpable masses bilaterally  NODES: No inguinal nor axillary LAD  ABDOMEN: soft Non tender Non distended No masses  PELVIC:   Normal external genitalia without lesions.   Normal urethral meatus.   No signif cystocele or rectocele.  Vagina atrophic without lesions or discharge.  Cuff intact  Cervix absent  Adnexa without masses or tenderness.    EXTREMITIES: No edema.    ASSESSMENT AND PLAN  Reshma was seen today for well woman.    Diagnoses and all orders for this visit:    Encounter for gynecological examination    MMG to be done today  Pap not needed   S/p Hyst       Patient was counseled today on A.C.S. Pap guidelines and recommendations for yearly pelvic exams, mammograms and monthly self breast exams; to see her PCP for other health maintenance.     Patient encouraged to register for portal and results will be sent via portal.         Health Maintenance   Topic Date Due    Hepatitis C Screening  Never done    Lipid Panel  Never done    TETANUS VACCINE  Never done    Mammogram  12/02/2021                     "

## 2022-05-24 ENCOUNTER — PATIENT MESSAGE (OUTPATIENT)
Dept: ADMINISTRATIVE | Facility: HOSPITAL | Age: 47
End: 2022-05-24
Payer: COMMERCIAL

## 2022-05-24 ENCOUNTER — PATIENT OUTREACH (OUTPATIENT)
Dept: ADMINISTRATIVE | Facility: HOSPITAL | Age: 47
End: 2022-05-24
Payer: COMMERCIAL

## 2022-05-24 NOTE — PROGRESS NOTES
Non-compliant GAP report chart review - Chart review completed for the following HM test if overdue  Colonoscopy,   Care Everywhere and Media reports - updates requested and reviewed.      COLON CANCER SCREENING

## 2022-06-28 ENCOUNTER — OFFICE VISIT (OUTPATIENT)
Dept: FAMILY MEDICINE | Facility: CLINIC | Age: 47
End: 2022-06-28
Payer: COMMERCIAL

## 2022-06-28 VITALS
BODY MASS INDEX: 29.1 KG/M2 | HEART RATE: 110 BPM | TEMPERATURE: 99 F | DIASTOLIC BLOOD PRESSURE: 88 MMHG | RESPIRATION RATE: 12 BRPM | OXYGEN SATURATION: 98 % | SYSTOLIC BLOOD PRESSURE: 138 MMHG | WEIGHT: 169.56 LBS

## 2022-06-28 DIAGNOSIS — R05.9 COUGH: ICD-10-CM

## 2022-06-28 DIAGNOSIS — U07.1 COVID-19: Primary | ICD-10-CM

## 2022-06-28 DIAGNOSIS — R50.9 FEVER, UNSPECIFIED FEVER CAUSE: ICD-10-CM

## 2022-06-28 LAB
CTP QC/QA: YES
SARS-COV-2 RDRP RESP QL NAA+PROBE: POSITIVE

## 2022-06-28 PROCEDURE — 1160F PR REVIEW ALL MEDS BY PRESCRIBER/CLIN PHARMACIST DOCUMENTED: ICD-10-PCS | Mod: CPTII,S$GLB,, | Performed by: NURSE PRACTITIONER

## 2022-06-28 PROCEDURE — 1159F PR MEDICATION LIST DOCUMENTED IN MEDICAL RECORD: ICD-10-PCS | Mod: CPTII,S$GLB,, | Performed by: NURSE PRACTITIONER

## 2022-06-28 PROCEDURE — 99214 PR OFFICE/OUTPT VISIT, EST, LEVL IV, 30-39 MIN: ICD-10-PCS | Mod: S$GLB,,, | Performed by: NURSE PRACTITIONER

## 2022-06-28 PROCEDURE — 3079F PR MOST RECENT DIASTOLIC BLOOD PRESSURE 80-89 MM HG: ICD-10-PCS | Mod: CPTII,S$GLB,, | Performed by: NURSE PRACTITIONER

## 2022-06-28 PROCEDURE — 99214 OFFICE O/P EST MOD 30 MIN: CPT | Mod: S$GLB,,, | Performed by: NURSE PRACTITIONER

## 2022-06-28 PROCEDURE — 3008F PR BODY MASS INDEX (BMI) DOCUMENTED: ICD-10-PCS | Mod: CPTII,S$GLB,, | Performed by: NURSE PRACTITIONER

## 2022-06-28 PROCEDURE — 3075F PR MOST RECENT SYSTOLIC BLOOD PRESS GE 130-139MM HG: ICD-10-PCS | Mod: CPTII,S$GLB,, | Performed by: NURSE PRACTITIONER

## 2022-06-28 PROCEDURE — 3075F SYST BP GE 130 - 139MM HG: CPT | Mod: CPTII,S$GLB,, | Performed by: NURSE PRACTITIONER

## 2022-06-28 PROCEDURE — U0002 COVID-19 LAB TEST NON-CDC: HCPCS | Mod: QW,S$GLB,, | Performed by: NURSE PRACTITIONER

## 2022-06-28 PROCEDURE — U0002: ICD-10-PCS | Mod: QW,S$GLB,, | Performed by: NURSE PRACTITIONER

## 2022-06-28 PROCEDURE — 3079F DIAST BP 80-89 MM HG: CPT | Mod: CPTII,S$GLB,, | Performed by: NURSE PRACTITIONER

## 2022-06-28 PROCEDURE — 1159F MED LIST DOCD IN RCRD: CPT | Mod: CPTII,S$GLB,, | Performed by: NURSE PRACTITIONER

## 2022-06-28 PROCEDURE — 1160F RVW MEDS BY RX/DR IN RCRD: CPT | Mod: CPTII,S$GLB,, | Performed by: NURSE PRACTITIONER

## 2022-06-28 PROCEDURE — 3008F BODY MASS INDEX DOCD: CPT | Mod: CPTII,S$GLB,, | Performed by: NURSE PRACTITIONER

## 2022-06-28 RX ORDER — CODEINE PHOSPHATE AND GUAIFENESIN 10; 100 MG/5ML; MG/5ML
5 SOLUTION ORAL 3 TIMES DAILY PRN
Qty: 120 ML | Refills: 0 | Status: SHIPPED | OUTPATIENT
Start: 2022-06-28 | End: 2022-07-08

## 2022-06-28 NOTE — PROGRESS NOTES
Subjective:       Patient ID: Reshma Rogers is a 46 y.o. female.    Chief Complaint: Cough and Sore Throat    Cough  This is a recurrent problem. The current episode started in the past 7 days. The problem has been gradually worsening. The problem occurs hourly. The cough is non-productive and productive of sputum. Associated symptoms include a fever, nasal congestion, postnasal drip and a sore throat. Pertinent negatives include no chest pain, chills, ear congestion, ear pain, headaches, heartburn, hemoptysis, myalgias, rash, rhinorrhea, shortness of breath, sweats, weight loss or wheezing. She has tried nothing for the symptoms. Her past medical history is significant for asthma and environmental allergies. There is no history of bronchiectasis, bronchitis, COPD, emphysema or pneumonia.     Review of Systems   Constitutional: Positive for fever. Negative for appetite change, chills and weight loss.   HENT: Positive for postnasal drip and sore throat. Negative for ear pain and rhinorrhea.    Eyes: Negative for pain and itching.   Respiratory: Positive for cough. Negative for hemoptysis, chest tightness, shortness of breath and wheezing.    Cardiovascular: Negative for chest pain.   Gastrointestinal: Negative for abdominal distention, abdominal pain and heartburn.   Endocrine: Negative for polydipsia and polyuria.   Genitourinary: Negative for difficulty urinating and flank pain.   Musculoskeletal: Negative for myalgias.   Skin: Negative for color change, pallor and rash.   Allergic/Immunologic: Positive for environmental allergies.   Neurological: Negative for light-headedness and headaches.   Hematological: Negative for adenopathy. Does not bruise/bleed easily.   Psychiatric/Behavioral: Negative for agitation.       Past medical, surgical, family and social history reviewed.  Objective:     Vitals:    06/28/22 0731   BP: 138/88   Pulse: 110   Resp: 12   Temp: 99.1 °F (37.3 °C)   TempSrc: Temporal    SpO2: 98%   Weight: 76.9 kg (169 lb 8.5 oz)   PainSc: 0-No pain     Body mass index is 29.1 kg/m².     Physical Exam    Assessment:       1. COVID-19    2. Cough    3. Fever, unspecified fever cause        Plan:       Reshma was seen today for cough and sore throat.    Diagnoses and all orders for this visit:    COVID-19  Symptomatic tx       Cough  -     POCT COVID-19 Rapid Screening  Results for orders placed or performed in visit on 06/28/22   POCT COVID-19 Rapid Screening   Result Value Ref Range    POC Rapid COVID Positive (A) Negative     Acceptable Yes        Fever, unspecified fever cause  -     POCT COVID-19 Rapid Screening    Other orders  -     guaiFENesin-codeine 100-10 mg/5 ml (TUSSI-ORGANIDIN NR)  mg/5 mL syrup; Take 5 mLs by mouth 3 (three) times daily as needed for Cough.            Answers for HPI/ROS submitted by the patient on 6/28/2022  Chronicity: recurrent  Onset: in the past 7 days  Progression since onset: gradually worsening  Frequency: hourly  Cough characteristics: non-productive, productive of sputum  chest pain: No  chills: No  ear congestion: No  ear pain: No  fever: Yes  headaches: No  heartburn: No  hemoptysis: No  myalgias: No  nasal congestion: Yes  postnasal drip: Yes  rash: No  rhinorrhea: No  shortness of breath: No  sore throat: Yes  sweats: No  weight loss: No  wheezing: No  asthma: Yes  bronchiectasis: No  bronchitis: No  COPD: No  emphysema: No  environmental allergies: Yes  pneumonia: No  Treatments tried: nothing    I spent 30 minutes on this encounter, time includes face-to-face, chart review, documentation, test review and orders.

## 2022-06-29 ENCOUNTER — TELEPHONE (OUTPATIENT)
Dept: FAMILY MEDICINE | Facility: CLINIC | Age: 47
End: 2022-06-29
Payer: COMMERCIAL

## 2022-06-29 RX ORDER — PROMETHAZINE HYDROCHLORIDE AND DEXTROMETHORPHAN HYDROBROMIDE 6.25; 15 MG/5ML; MG/5ML
5 SYRUP ORAL EVERY 4 HOURS PRN
Qty: 240 ML | Refills: 0 | Status: SHIPPED | OUTPATIENT
Start: 2022-06-29 | End: 2022-07-09

## 2022-07-27 DIAGNOSIS — Z12.11 COLON CANCER SCREENING: ICD-10-CM

## 2022-08-01 ENCOUNTER — PATIENT MESSAGE (OUTPATIENT)
Dept: ADMINISTRATIVE | Facility: HOSPITAL | Age: 47
End: 2022-08-01
Payer: COMMERCIAL

## 2023-03-27 ENCOUNTER — PATIENT MESSAGE (OUTPATIENT)
Dept: OBSTETRICS AND GYNECOLOGY | Facility: CLINIC | Age: 48
End: 2023-03-27
Payer: COMMERCIAL

## 2023-03-27 DIAGNOSIS — Z12.31 SCREENING MAMMOGRAM FOR BREAST CANCER: Primary | ICD-10-CM

## 2023-03-29 ENCOUNTER — PATIENT MESSAGE (OUTPATIENT)
Dept: ADMINISTRATIVE | Facility: HOSPITAL | Age: 48
End: 2023-03-29
Payer: COMMERCIAL

## 2023-04-11 ENCOUNTER — PATIENT MESSAGE (OUTPATIENT)
Dept: ADMINISTRATIVE | Facility: HOSPITAL | Age: 48
End: 2023-04-11
Payer: COMMERCIAL

## 2023-05-18 ENCOUNTER — PATIENT MESSAGE (OUTPATIENT)
Dept: FAMILY MEDICINE | Facility: CLINIC | Age: 48
End: 2023-05-18
Payer: COMMERCIAL

## 2023-06-27 ENCOUNTER — OFFICE VISIT (OUTPATIENT)
Dept: OBSTETRICS AND GYNECOLOGY | Facility: CLINIC | Age: 48
End: 2023-06-27
Attending: OBSTETRICS & GYNECOLOGY
Payer: COMMERCIAL

## 2023-06-27 ENCOUNTER — APPOINTMENT (OUTPATIENT)
Dept: RADIOLOGY | Facility: OTHER | Age: 48
End: 2023-06-27
Attending: OBSTETRICS & GYNECOLOGY
Payer: COMMERCIAL

## 2023-06-27 VITALS
WEIGHT: 164.88 LBS | BODY MASS INDEX: 28.31 KG/M2 | HEIGHT: 64 IN | BODY MASS INDEX: 28.15 KG/M2 | SYSTOLIC BLOOD PRESSURE: 160 MMHG | DIASTOLIC BLOOD PRESSURE: 84 MMHG | HEIGHT: 64 IN

## 2023-06-27 DIAGNOSIS — Z01.419 ENCOUNTER FOR GYNECOLOGICAL EXAMINATION: Primary | ICD-10-CM

## 2023-06-27 DIAGNOSIS — B00.9 HSV INFECTION: ICD-10-CM

## 2023-06-27 DIAGNOSIS — Z12.31 SCREENING MAMMOGRAM FOR BREAST CANCER: ICD-10-CM

## 2023-06-27 PROCEDURE — 77067 SCR MAMMO BI INCL CAD: CPT | Mod: 26,,, | Performed by: RADIOLOGY

## 2023-06-27 PROCEDURE — 1159F PR MEDICATION LIST DOCUMENTED IN MEDICAL RECORD: ICD-10-PCS | Mod: CPTII,S$GLB,, | Performed by: OBSTETRICS & GYNECOLOGY

## 2023-06-27 PROCEDURE — 77067 SCR MAMMO BI INCL CAD: CPT | Mod: TC,PN

## 2023-06-27 PROCEDURE — 77067 MAMMO DIGITAL SCREENING BILAT WITH TOMO: ICD-10-PCS | Mod: 26,,, | Performed by: RADIOLOGY

## 2023-06-27 PROCEDURE — 1159F MED LIST DOCD IN RCRD: CPT | Mod: CPTII,S$GLB,, | Performed by: OBSTETRICS & GYNECOLOGY

## 2023-06-27 PROCEDURE — 3077F SYST BP >= 140 MM HG: CPT | Mod: CPTII,S$GLB,, | Performed by: OBSTETRICS & GYNECOLOGY

## 2023-06-27 PROCEDURE — 99999 PR PBB SHADOW E&M-EST. PATIENT-LVL III: CPT | Mod: PBBFAC,,, | Performed by: OBSTETRICS & GYNECOLOGY

## 2023-06-27 PROCEDURE — 3008F BODY MASS INDEX DOCD: CPT | Mod: CPTII,S$GLB,, | Performed by: OBSTETRICS & GYNECOLOGY

## 2023-06-27 PROCEDURE — 3079F DIAST BP 80-89 MM HG: CPT | Mod: CPTII,S$GLB,, | Performed by: OBSTETRICS & GYNECOLOGY

## 2023-06-27 PROCEDURE — 77063 BREAST TOMOSYNTHESIS BI: CPT | Mod: 26,,, | Performed by: RADIOLOGY

## 2023-06-27 PROCEDURE — 3077F PR MOST RECENT SYSTOLIC BLOOD PRESSURE >= 140 MM HG: ICD-10-PCS | Mod: CPTII,S$GLB,, | Performed by: OBSTETRICS & GYNECOLOGY

## 2023-06-27 PROCEDURE — 99396 PREV VISIT EST AGE 40-64: CPT | Mod: S$GLB,,, | Performed by: OBSTETRICS & GYNECOLOGY

## 2023-06-27 PROCEDURE — 99396 PR PREVENTIVE VISIT,EST,40-64: ICD-10-PCS | Mod: S$GLB,,, | Performed by: OBSTETRICS & GYNECOLOGY

## 2023-06-27 PROCEDURE — 3079F PR MOST RECENT DIASTOLIC BLOOD PRESSURE 80-89 MM HG: ICD-10-PCS | Mod: CPTII,S$GLB,, | Performed by: OBSTETRICS & GYNECOLOGY

## 2023-06-27 PROCEDURE — 77063 MAMMO DIGITAL SCREENING BILAT WITH TOMO: ICD-10-PCS | Mod: 26,,, | Performed by: RADIOLOGY

## 2023-06-27 PROCEDURE — 99999 PR PBB SHADOW E&M-EST. PATIENT-LVL III: ICD-10-PCS | Mod: PBBFAC,,, | Performed by: OBSTETRICS & GYNECOLOGY

## 2023-06-27 PROCEDURE — 3008F PR BODY MASS INDEX (BMI) DOCUMENTED: ICD-10-PCS | Mod: CPTII,S$GLB,, | Performed by: OBSTETRICS & GYNECOLOGY

## 2023-06-27 RX ORDER — VALACYCLOVIR HYDROCHLORIDE 500 MG/1
500 TABLET, FILM COATED ORAL 2 TIMES DAILY PRN
Qty: 60 TABLET | Refills: 4 | Status: SHIPPED | OUTPATIENT
Start: 2023-06-27 | End: 2023-10-11 | Stop reason: SDUPTHER

## 2023-06-27 NOTE — PROGRESS NOTES
LMP: Patient's last menstrual period was 12/03/2016 (exact date)..    Meds per MD: Valtrex    Last Pap: DVH/ LSO  Last MMG: today  Last Colonoscopy: never

## 2023-06-28 NOTE — PROGRESS NOTES
Chief Complaint Well woman exam:  Annual Exam    She is established.     Reshma Rogers is a 47 y.o. female  presents for a well woman exam.    No GYN c/o  S/p DAVINCI HYSTERECTOMY and LSO  Mom with recent death with ovarian cancer- BRCA neg     ROS:no flashes nor sweats  No abdominal pain No vaginal bleeding or discharge  No breast pain or masses, No rectal bleeding      LMP: Patient's last menstrual period was 2016 (exact date)..    Meds per MD: Valtrex     Last Pap: DVH/ LSO  Last MMG: today  Last Colonoscopy: never       Past Medical History:   Diagnosis Date    Abnormal Pap smear of cervix     prior had mild dysplasia treared with cryo ,nl since per pt     Anxiety     Anxiety and depression     Asthma     Breast disorder 2017    Left breast Lump    Herpes simplex virus (HSV) infection     Infertility, female     PCOS (polycystic ovarian syndrome)     Polycythemia     Thrombophilia     MTHFR [id number]/ [id number]    Thyroid disease     history of hyporthyroidism       Past Surgical History:   Procedure Laterality Date    AUGMENTATION OF BREAST      BREAST AUGMENTATION  2006    BREAST BIOPSY Left 2017    BREAST IMPLANT REMOVAL        SECTION      DILATION AND CURETTAGE OF UTERUS      GYNECOLOGIC CRYOSURGERY  2004    HYSTERECTOMY  2016    . Robotic DVH/LSO (Right ovary remains)     OOPHORECTOMY Left 2016    Right ovary remains     RHINOPLASTY TIP         OB History    Para Term  AB Living   4 3 2 1 1 1   SAB IAB Ectopic Multiple Live Births   1       1      # Outcome Date GA Lbr Dennis/2nd Weight Sex Delivery Anes PTL Lv   4   33w0d  2.07 kg (4 lb 9 oz) M CS-LTranv Spinal  RITA   3 SAB  6w0d          2 Term            1 Term                Family History   Problem Relation Age of Onset    Breast cancer Maternal Grandmother     Cancer Maternal Grandmother     Deep vein thrombosis Father     Eclampsia Father     Hypertension Father   "   Ovarian cancer Mother 76        BRCA negative    Diabetes Mother     Stroke Mother     Ovarian cancer Maternal Aunt     Colon cancer Neg Hx        Social History     Tobacco Use    Smoking status: Never    Smokeless tobacco: Never   Substance Use Topics    Alcohol use: Yes     Comment: Occational    Drug use: No       Physical Exam:  BP (!) 160/84 (Patient Position: Sitting)   Ht 5' 4" (1.626 m)   LMP 12/03/2016 (Exact Date)   BMI 28.31 kg/m²     APPEARANCE: Well nourished, well developed, in no acute distress.  AFFECT: WNL, alert and oriented x 3  SKIN: No acne or hirsutism  BREASTS: Symmetrical, no skin changes.                      No nipple discharge.   No palpable masses bilaterally  NODES: No inguinal nor axillary LAD  ABDOMEN: soft Non tender Non distended No masses  PELVIC:   Normal external genitalia without lesions.   Normal urethral meatus.   No signif cystocele or rectocele.  Vagina atrophic without lesions or discharge.  Cuff intact  Cervix absent  Adnexa without masses or tenderness.    EXTREMITIES: No edema.    ASSESSMENT AND PLAN  Reshma was seen today for annual exam.    Diagnoses and all orders for this visit:    Encounter for gynecological examination    HSV infection  -     valACYclovir (VALTREX) 500 MG tablet; Take 1 tablet (500 mg total) by mouth 2 (two) times daily as needed.    TC 21% went to [facility] center but opting out of 2x yr testing  Mom with recent death with ovarian cancer  S/p DAVINCI HYSTERECTOMY and LSO      Patient was counseled today on A.C.S. Pap guidelines and recommendations for yearly pelvic exams, mammograms and monthly self breast exams; to see her PCP for other health maintenance.     Patient encouraged to register for portal and results will be sent via portal.             Health Maintenance   Topic Date Due    Hepatitis C Screening  Never done    Lipid Panel  Never done    TETANUS VACCINE  Never done    Mammogram  05/16/2023                     "

## 2023-10-11 ENCOUNTER — PATIENT MESSAGE (OUTPATIENT)
Dept: OBSTETRICS AND GYNECOLOGY | Facility: CLINIC | Age: 48
End: 2023-10-11
Payer: COMMERCIAL

## 2023-10-11 DIAGNOSIS — B00.9 HSV INFECTION: ICD-10-CM

## 2023-10-11 RX ORDER — VALACYCLOVIR HYDROCHLORIDE 500 MG/1
500 TABLET, FILM COATED ORAL 2 TIMES DAILY PRN
Qty: 60 TABLET | Refills: 4 | Status: SHIPPED | OUTPATIENT
Start: 2023-10-11

## 2024-07-16 ENCOUNTER — PATIENT MESSAGE (OUTPATIENT)
Dept: FAMILY MEDICINE | Facility: CLINIC | Age: 49
End: 2024-07-16

## 2024-07-16 ENCOUNTER — OFFICE VISIT (OUTPATIENT)
Dept: FAMILY MEDICINE | Facility: CLINIC | Age: 49
End: 2024-07-16
Payer: COMMERCIAL

## 2024-07-16 VITALS
BODY MASS INDEX: 31.71 KG/M2 | TEMPERATURE: 99 F | HEIGHT: 64 IN | SYSTOLIC BLOOD PRESSURE: 140 MMHG | HEART RATE: 76 BPM | DIASTOLIC BLOOD PRESSURE: 100 MMHG | WEIGHT: 185.75 LBS | RESPIRATION RATE: 18 BRPM | OXYGEN SATURATION: 98 %

## 2024-07-16 DIAGNOSIS — Z12.11 COLON CANCER SCREENING: ICD-10-CM

## 2024-07-16 DIAGNOSIS — I10 HYPERTENSION, UNSPECIFIED TYPE: Primary | ICD-10-CM

## 2024-07-16 DIAGNOSIS — R23.2 HOT FLASHES: ICD-10-CM

## 2024-07-16 DIAGNOSIS — Z00.00 LABORATORY EXAM ORDERED AS PART OF ROUTINE GENERAL MEDICAL EXAMINATION: ICD-10-CM

## 2024-07-16 DIAGNOSIS — E28.2 PCOS (POLYCYSTIC OVARIAN SYNDROME): ICD-10-CM

## 2024-07-16 LAB
ALBUMIN SERPL BCP-MCNC: 4.2 G/DL (ref 3.5–5.2)
ALP SERPL-CCNC: 76 U/L (ref 55–135)
ALT SERPL W/O P-5'-P-CCNC: 18 U/L (ref 10–44)
ANION GAP SERPL CALC-SCNC: 8 MMOL/L (ref 8–16)
AST SERPL-CCNC: 15 U/L (ref 10–40)
BASOPHILS # BLD AUTO: 0.05 K/UL (ref 0–0.2)
BASOPHILS NFR BLD: 0.6 % (ref 0–1.9)
BILIRUB SERPL-MCNC: 0.5 MG/DL (ref 0.1–1)
BUN SERPL-MCNC: 17 MG/DL (ref 6–20)
CALCIUM SERPL-MCNC: 9.8 MG/DL (ref 8.7–10.5)
CHLORIDE SERPL-SCNC: 107 MMOL/L (ref 95–110)
CHOLEST SERPL-MCNC: 178 MG/DL (ref 120–199)
CHOLEST/HDLC SERPL: 2.9 {RATIO} (ref 2–5)
CO2 SERPL-SCNC: 26 MMOL/L (ref 23–29)
CREAT SERPL-MCNC: 0.9 MG/DL (ref 0.5–1.4)
DIFFERENTIAL METHOD BLD: ABNORMAL
EOSINOPHIL # BLD AUTO: 0.5 K/UL (ref 0–0.5)
EOSINOPHIL NFR BLD: 6 % (ref 0–8)
ERYTHROCYTE [DISTWIDTH] IN BLOOD BY AUTOMATED COUNT: 13.1 % (ref 11.5–14.5)
EST. GFR  (NO RACE VARIABLE): >60 ML/MIN/1.73 M^2
ESTIMATED AVG GLUCOSE: 97 MG/DL (ref 68–131)
ESTRADIOL SERPL-MCNC: 69 PG/ML
FSH SERPL-ACNC: 21.06 MIU/ML
GLUCOSE SERPL-MCNC: 96 MG/DL (ref 70–110)
HBA1C MFR BLD: 5 % (ref 4–5.6)
HCT VFR BLD AUTO: 48.8 % (ref 37–48.5)
HDLC SERPL-MCNC: 61 MG/DL (ref 40–75)
HDLC SERPL: 34.3 % (ref 20–50)
HGB BLD-MCNC: 16 G/DL (ref 12–16)
IMM GRANULOCYTES # BLD AUTO: 0.02 K/UL (ref 0–0.04)
IMM GRANULOCYTES NFR BLD AUTO: 0.3 % (ref 0–0.5)
INSULIN COLLECTION INTERVAL: NORMAL
INSULIN SERPL-ACNC: 7.7 UU/ML
LDLC SERPL CALC-MCNC: 103.2 MG/DL (ref 63–159)
LYMPHOCYTES # BLD AUTO: 2.4 K/UL (ref 1–4.8)
LYMPHOCYTES NFR BLD: 30.9 % (ref 18–48)
MCH RBC QN AUTO: 30.5 PG (ref 27–31)
MCHC RBC AUTO-ENTMCNC: 32.8 G/DL (ref 32–36)
MCV RBC AUTO: 93 FL (ref 82–98)
MONOCYTES # BLD AUTO: 0.7 K/UL (ref 0.3–1)
MONOCYTES NFR BLD: 8.4 % (ref 4–15)
NEUTROPHILS # BLD AUTO: 4.1 K/UL (ref 1.8–7.7)
NEUTROPHILS NFR BLD: 53.8 % (ref 38–73)
NONHDLC SERPL-MCNC: 117 MG/DL
NRBC BLD-RTO: 0 /100 WBC
PLATELET # BLD AUTO: 248 K/UL (ref 150–450)
PMV BLD AUTO: 10.6 FL (ref 9.2–12.9)
POTASSIUM SERPL-SCNC: 5.5 MMOL/L (ref 3.5–5.1)
PROT SERPL-MCNC: 7.1 G/DL (ref 6–8.4)
RBC # BLD AUTO: 5.24 M/UL (ref 4–5.4)
SODIUM SERPL-SCNC: 141 MMOL/L (ref 136–145)
TRIGL SERPL-MCNC: 69 MG/DL (ref 30–150)
TSH SERPL DL<=0.005 MIU/L-ACNC: 1.34 UIU/ML (ref 0.4–4)
WBC # BLD AUTO: 7.7 K/UL (ref 3.9–12.7)

## 2024-07-16 PROCEDURE — 99214 OFFICE O/P EST MOD 30 MIN: CPT | Mod: S$GLB,,, | Performed by: NURSE PRACTITIONER

## 2024-07-16 PROCEDURE — 83036 HEMOGLOBIN GLYCOSYLATED A1C: CPT | Performed by: NURSE PRACTITIONER

## 2024-07-16 PROCEDURE — 3080F DIAST BP >= 90 MM HG: CPT | Mod: CPTII,S$GLB,, | Performed by: NURSE PRACTITIONER

## 2024-07-16 PROCEDURE — 85025 COMPLETE CBC W/AUTO DIFF WBC: CPT | Performed by: NURSE PRACTITIONER

## 2024-07-16 PROCEDURE — 83001 ASSAY OF GONADOTROPIN (FSH): CPT | Performed by: NURSE PRACTITIONER

## 2024-07-16 PROCEDURE — 4010F ACE/ARB THERAPY RXD/TAKEN: CPT | Mod: CPTII,S$GLB,, | Performed by: NURSE PRACTITIONER

## 2024-07-16 PROCEDURE — 3077F SYST BP >= 140 MM HG: CPT | Mod: CPTII,S$GLB,, | Performed by: NURSE PRACTITIONER

## 2024-07-16 PROCEDURE — 84443 ASSAY THYROID STIM HORMONE: CPT | Performed by: NURSE PRACTITIONER

## 2024-07-16 PROCEDURE — 1160F RVW MEDS BY RX/DR IN RCRD: CPT | Mod: CPTII,S$GLB,, | Performed by: NURSE PRACTITIONER

## 2024-07-16 PROCEDURE — 3008F BODY MASS INDEX DOCD: CPT | Mod: CPTII,S$GLB,, | Performed by: NURSE PRACTITIONER

## 2024-07-16 PROCEDURE — 1159F MED LIST DOCD IN RCRD: CPT | Mod: CPTII,S$GLB,, | Performed by: NURSE PRACTITIONER

## 2024-07-16 PROCEDURE — 82670 ASSAY OF TOTAL ESTRADIOL: CPT | Performed by: NURSE PRACTITIONER

## 2024-07-16 PROCEDURE — 80053 COMPREHEN METABOLIC PANEL: CPT | Performed by: NURSE PRACTITIONER

## 2024-07-16 PROCEDURE — 36415 COLL VENOUS BLD VENIPUNCTURE: CPT | Mod: S$GLB,,, | Performed by: NURSE PRACTITIONER

## 2024-07-16 PROCEDURE — 80061 LIPID PANEL: CPT | Performed by: NURSE PRACTITIONER

## 2024-07-16 PROCEDURE — 83525 ASSAY OF INSULIN: CPT | Performed by: NURSE PRACTITIONER

## 2024-07-16 RX ORDER — CLONIDINE HYDROCHLORIDE 0.1 MG/1
0.1 TABLET ORAL
Status: COMPLETED | OUTPATIENT
Start: 2024-07-16 | End: 2024-07-16

## 2024-07-16 RX ORDER — LOSARTAN POTASSIUM 50 MG/1
50 TABLET ORAL DAILY
Qty: 90 TABLET | Refills: 3 | Status: SHIPPED | OUTPATIENT
Start: 2024-07-16 | End: 2025-07-16

## 2024-07-16 RX ADMIN — CLONIDINE HYDROCHLORIDE 0.1 MG: 0.1 TABLET ORAL at 10:07

## 2024-07-16 NOTE — PROGRESS NOTES
"Subjective:       Patient ID: Reshma Rogers is a 48 y.o. female.    Chief Complaint: Follow-up  Pt here to have labs done  BP elevated when she had breast implants removed, thought she was just nervous.   No HEADACHE or vision changes    Under a great deal of stress, dad , mom with cancer  within 6 months of diagnosis. Still fighting with sibling regarding estate.   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  Has gained weight.      Follow-up  Pertinent negatives include no arthralgias, chest pain, headaches, joint swelling, neck pain, vomiting or weakness.     Review of Systems   Constitutional:  Positive for unexpected weight change. Negative for activity change.   HENT:  Negative for hearing loss, rhinorrhea and trouble swallowing.    Eyes:  Negative for discharge and visual disturbance.   Respiratory:  Negative for chest tightness and wheezing.    Cardiovascular:  Negative for chest pain and palpitations.   Gastrointestinal:  Negative for blood in stool, constipation, diarrhea and vomiting.   Endocrine: Negative for polydipsia and polyuria.   Genitourinary:  Negative for difficulty urinating, dysuria, hematuria and menstrual problem.   Musculoskeletal:  Negative for arthralgias, joint swelling and neck pain.   Neurological:  Negative for weakness and headaches.   Psychiatric/Behavioral:  Negative for confusion and dysphoric mood.        Past medical, surgical, family and social history reviewed.  Objective:     Vitals:    24 0911 24 0919 24 1044   BP: (!) 168/118 (!) 156/118 (!) 140/100   Pulse: 76     Resp: 18     Temp: 98.9 °F (37.2 °C)     SpO2: 98%     Weight: 84.2 kg (185 lb 11.8 oz)     Height: 5' 4" (1.626 m)     PainSc: 0-No pain       Body mass index is 31.88 kg/m².     Physical Exam  Constitutional:       General: She is not in acute distress.     Appearance: She is well-developed. She is obese. She is not diaphoretic.   HENT:      Head: " "Normocephalic and atraumatic.      Right Ear: Hearing, tympanic membrane, ear canal and external ear normal.      Left Ear: Hearing, tympanic membrane, ear canal and external ear normal.      Nose: Nose normal.      Mouth/Throat:      Pharynx: Uvula midline.   Eyes:      General:         Right eye: No discharge.         Left eye: No discharge.      Conjunctiva/sclera: Conjunctivae normal.      Pupils: Pupils are equal, round, and reactive to light.   Neck:      Thyroid: No thyromegaly.      Vascular: No carotid bruit or JVD.      Trachea: Trachea normal.   Cardiovascular:      Rate and Rhythm: Normal rate and regular rhythm.      Heart sounds: No murmur heard.     No friction rub. No gallop.   Pulmonary:      Effort: Pulmonary effort is normal. No respiratory distress.      Breath sounds: Normal breath sounds. No wheezing or rales.   Chest:      Chest wall: No tenderness.   Abdominal:      General: Bowel sounds are normal. There is no distension.      Palpations: Abdomen is soft. There is no mass.      Tenderness: There is no abdominal tenderness. There is no guarding or rebound.   Musculoskeletal:         General: Normal range of motion.      Cervical back: Normal range of motion and neck supple.   Skin:     General: Skin is warm and dry.   Neurological:      Mental Status: She is alert and oriented to person, place, and time.      Coordination: Coordination normal.   Psychiatric:         Behavior: Behavior normal.         Thought Content: Thought content normal.         Judgment: Judgment normal.         Assessment:       1. Hypertension, unspecified type        Plan:       Reshma Escalante" was seen today for follow-up.    Diagnoses and all orders for this visit:    Hypertension, unspecified type  -     cloNIDine tablet 0.1 mg  BP came down to 140/100 after cloidine    -     losartan (COZAAR) 50 MG tablet; Take 1 tablet (50 mg total) by mouth once daily.    Laboratory exam ordered as part of routine general medical " examination  -     CBC Auto Differential  -     Comprehensive Metabolic Panel  -     Hemoglobin A1C  -     Lipid Panel  -     TSH    Hot flashes  -     FOLLICLE STIMULATING HORMONE  -     ESTRADIOL    PCOS (polycystic ovarian syndrome)  -     Insulin, random    I spent 30 minutes on this encounter, time includes face-to-face, chart review, documentation, test review and orders.

## 2024-07-16 NOTE — PROGRESS NOTES
Answers submitted by the patient for this visit:  Review of Systems Questionnaire (Submitted on 7/15/2024)  activity change: No  unexpected weight change: Yes  neck pain: No  hearing loss: No  rhinorrhea: No  trouble swallowing: No  eye discharge: No  visual disturbance: No  chest tightness: No  wheezing: No  chest pain: No  palpitations: No  blood in stool: No  constipation: No  vomiting: No  diarrhea: No  polydipsia: No  polyuria: No  difficulty urinating: No  hematuria: No  menstrual problem: No  dysuria: No  joint swelling: No  arthralgias: No  headaches: No  weakness: No  confusion: No  dysphoric mood: No

## 2024-07-22 NOTE — PROGRESS NOTES
I have released your blood work.  We will review this in detail at our upcoming visit.  Have a great day.    Marielle Munoz [facility]  [address]  [place] [address]  [phone] (phone)  [phone] (fax)

## 2024-07-23 ENCOUNTER — PATIENT MESSAGE (OUTPATIENT)
Dept: FAMILY MEDICINE | Facility: CLINIC | Age: 49
End: 2024-07-23
Payer: COMMERCIAL

## 2024-07-30 ENCOUNTER — PATIENT MESSAGE (OUTPATIENT)
Dept: FAMILY MEDICINE | Facility: CLINIC | Age: 49
End: 2024-07-30

## 2024-07-30 ENCOUNTER — OFFICE VISIT (OUTPATIENT)
Dept: FAMILY MEDICINE | Facility: CLINIC | Age: 49
End: 2024-07-30
Payer: COMMERCIAL

## 2024-07-30 VITALS
TEMPERATURE: 98 F | OXYGEN SATURATION: 99 % | RESPIRATION RATE: 18 BRPM | HEART RATE: 76 BPM | DIASTOLIC BLOOD PRESSURE: 88 MMHG | BODY MASS INDEX: 31.84 KG/M2 | SYSTOLIC BLOOD PRESSURE: 136 MMHG | HEIGHT: 64 IN | WEIGHT: 186.5 LBS

## 2024-07-30 DIAGNOSIS — R23.2 HOT FLASHES: ICD-10-CM

## 2024-07-30 DIAGNOSIS — E87.5 HYPERKALEMIA: ICD-10-CM

## 2024-07-30 DIAGNOSIS — I10 HYPERTENSION, UNSPECIFIED TYPE: Primary | ICD-10-CM

## 2024-07-30 DIAGNOSIS — E66.9 OBESITY, UNSPECIFIED CLASSIFICATION, UNSPECIFIED OBESITY TYPE, UNSPECIFIED WHETHER SERIOUS COMORBIDITY PRESENT: ICD-10-CM

## 2024-07-30 DIAGNOSIS — E87.5 HYPERKALEMIA: Primary | ICD-10-CM

## 2024-07-30 LAB — POTASSIUM SERPL-SCNC: 5.4 MMOL/L (ref 3.5–5.1)

## 2024-07-30 PROCEDURE — 1159F MED LIST DOCD IN RCRD: CPT | Mod: CPTII,S$GLB,, | Performed by: NURSE PRACTITIONER

## 2024-07-30 PROCEDURE — 3044F HG A1C LEVEL LT 7.0%: CPT | Mod: CPTII,S$GLB,, | Performed by: NURSE PRACTITIONER

## 2024-07-30 PROCEDURE — 99214 OFFICE O/P EST MOD 30 MIN: CPT | Mod: S$GLB,,, | Performed by: NURSE PRACTITIONER

## 2024-07-30 PROCEDURE — 3075F SYST BP GE 130 - 139MM HG: CPT | Mod: CPTII,S$GLB,, | Performed by: NURSE PRACTITIONER

## 2024-07-30 PROCEDURE — 3008F BODY MASS INDEX DOCD: CPT | Mod: CPTII,S$GLB,, | Performed by: NURSE PRACTITIONER

## 2024-07-30 PROCEDURE — 84132 ASSAY OF SERUM POTASSIUM: CPT | Performed by: NURSE PRACTITIONER

## 2024-07-30 PROCEDURE — 4010F ACE/ARB THERAPY RXD/TAKEN: CPT | Mod: CPTII,S$GLB,, | Performed by: NURSE PRACTITIONER

## 2024-07-30 PROCEDURE — 3079F DIAST BP 80-89 MM HG: CPT | Mod: CPTII,S$GLB,, | Performed by: NURSE PRACTITIONER

## 2024-07-30 PROCEDURE — 36415 COLL VENOUS BLD VENIPUNCTURE: CPT | Mod: S$GLB,,, | Performed by: NURSE PRACTITIONER

## 2024-07-30 PROCEDURE — 1160F RVW MEDS BY RX/DR IN RCRD: CPT | Mod: CPTII,S$GLB,, | Performed by: NURSE PRACTITIONER

## 2024-07-30 RX ORDER — ESTRADIOL 0.5 MG/1
0.5 TABLET ORAL DAILY
Qty: 30 TABLET | Refills: 11 | Status: SHIPPED | OUTPATIENT
Start: 2024-07-30 | End: 2025-07-30

## 2024-07-30 RX ORDER — SEMAGLUTIDE 0.25 MG/.5ML
0.25 INJECTION, SOLUTION SUBCUTANEOUS
Qty: 2 ML | Refills: 1 | Status: SHIPPED | OUTPATIENT
Start: 2024-07-30

## 2024-07-30 NOTE — PROGRESS NOTES
"Subjective:       Patient ID: Reshma Rogers is a 48 y.o. female.    Chief Complaint: Follow-up  The pt is here for a HTN f/u  She was put on losartan 50 mg daily  BP much better, tolerating well.     Pt has donated blood since last visit  Hct 48.8  MTHFR [id number]    Follow-up  Pertinent negatives include no abdominal pain, chills, fever or headaches.     Review of Systems   Constitutional:  Negative for appetite change, chills and fever.   HENT:  Negative for ear pain and postnasal drip.    Eyes:  Negative for pain and itching.   Respiratory:  Negative for chest tightness and shortness of breath.    Gastrointestinal:  Negative for abdominal distention and abdominal pain.   Endocrine: Negative for polydipsia and polyuria.   Genitourinary:  Negative for difficulty urinating and flank pain.   Skin:  Negative for color change and pallor.   Neurological:  Negative for light-headedness and headaches.   Hematological:  Negative for adenopathy. Does not bruise/bleed easily.   Psychiatric/Behavioral:  Negative for agitation.        Past medical, surgical, family and social history reviewed.  Objective:     Vitals:    07/30/24 0825   BP: 136/88   Pulse: 76   Resp: 18   Temp: 98.4 °F (36.9 °C)   SpO2: 99%   Weight: 84.6 kg (186 lb 8.2 oz)   Height: 5' 4" (1.626 m)   PainSc: 0-No pain     Body mass index is 32.01 kg/m².     Physical Exam  Constitutional:       Appearance: She is well-developed. She is obese.   HENT:      Head: Normocephalic and atraumatic.      Right Ear: External ear normal.      Left Ear: External ear normal.      Nose: Nose normal.   Eyes:      General: No scleral icterus.        Right eye: No discharge.         Left eye: No discharge.      Conjunctiva/sclera: Conjunctivae normal.   Neck:      Trachea: No tracheal deviation.   Cardiovascular:      Rate and Rhythm: Normal rate and regular rhythm.      Heart sounds: Normal heart sounds. No murmur heard.     No friction rub.   Pulmonary:      " "Effort: Pulmonary effort is normal. No respiratory distress.      Breath sounds: Normal breath sounds. No stridor. No wheezing or rales.   Chest:      Chest wall: No tenderness.   Musculoskeletal:         General: Normal range of motion.      Cervical back: Normal range of motion and neck supple.   Lymphadenopathy:      Cervical: No cervical adenopathy.   Skin:     General: Skin is warm and dry.   Neurological:      Mental Status: She is alert and oriented to person, place, and time.       Assessment:       1. Hypertension, unspecified type    2. Hyperkalemia    3. Hot flashes    4. Obesity, unspecified classification, unspecified obesity type, unspecified whether serious comorbidity present        Plan:       Reshma Escalante" was seen today for follow-up.    Diagnoses and all orders for this visit:    Hypertension, unspecified type  Stable. Continue current medications.    Hyperkalemia  -     POTASSIUM    Hot flashes  Defer this until she is on her blood pressure medication for another month as this may be contributing to her hot flashes.  She does state she feels slightly improved just since starting the blood pressure medication.  We did have a long conversation about blood clotting.  The patient does not smoke.  -     estradioL (ESTRACE) 0.5 MG tablet; Take 1 tablet (0.5 mg total) by mouth once daily.    Obesity, unspecified classification, unspecified obesity type, unspecified whether serious comorbidity present  -     semaglutide, weight loss, (WEGOVY) 0.25 mg/0.5 mL PnIj; Inject 0.25 mg into the skin every 7 days.          I spent 30 minutes on this encounter, time includes face-to-face, chart review, documentation, test review and orders.     "

## 2024-09-06 ENCOUNTER — LAB VISIT (OUTPATIENT)
Dept: LAB | Facility: HOSPITAL | Age: 49
End: 2024-09-06
Payer: COMMERCIAL

## 2024-09-06 DIAGNOSIS — E87.5 HYPERKALEMIA: ICD-10-CM

## 2024-09-06 LAB — POTASSIUM SERPL-SCNC: 5.3 MMOL/L (ref 3.5–5.1)

## 2024-09-06 PROCEDURE — 84132 ASSAY OF SERUM POTASSIUM: CPT | Performed by: NURSE PRACTITIONER

## 2024-09-06 PROCEDURE — 36415 COLL VENOUS BLD VENIPUNCTURE: CPT | Mod: PO | Performed by: NURSE PRACTITIONER

## 2024-09-23 ENCOUNTER — PATIENT MESSAGE (OUTPATIENT)
Dept: FAMILY MEDICINE | Facility: CLINIC | Age: 49
End: 2024-09-23
Payer: COMMERCIAL

## 2024-09-23 ENCOUNTER — HOSPITAL ENCOUNTER (OUTPATIENT)
Dept: RADIOLOGY | Facility: HOSPITAL | Age: 49
Discharge: HOME | End: 2024-09-23
Attending: PODIATRIST
Payer: COMMERCIAL

## 2024-09-23 ENCOUNTER — OFFICE VISIT (OUTPATIENT)
Dept: PODIATRY | Facility: CLINIC | Age: 49
End: 2024-09-23
Payer: COMMERCIAL

## 2024-09-23 VITALS — WEIGHT: 186.5 LBS | BODY MASS INDEX: 31.84 KG/M2 | HEIGHT: 64 IN

## 2024-09-23 DIAGNOSIS — M84.375A STRESS FRACTURE OF METATARSAL BONE OF LEFT FOOT, INITIAL ENCOUNTER: Primary | ICD-10-CM

## 2024-09-23 DIAGNOSIS — M84.375A STRESS FRACTURE OF METATARSAL BONE OF LEFT FOOT, INITIAL ENCOUNTER: ICD-10-CM

## 2024-09-23 PROCEDURE — 1160F RVW MEDS BY RX/DR IN RCRD: CPT | Mod: CPTII,S$GLB,, | Performed by: PODIATRIST

## 2024-09-23 PROCEDURE — 3008F BODY MASS INDEX DOCD: CPT | Mod: CPTII,S$GLB,, | Performed by: PODIATRIST

## 2024-09-23 PROCEDURE — 99999 PR PBB SHADOW E&M-EST. PATIENT-LVL III: CPT | Mod: PBBFAC,,, | Performed by: PODIATRIST

## 2024-09-23 PROCEDURE — 4010F ACE/ARB THERAPY RXD/TAKEN: CPT | Mod: CPTII,S$GLB,, | Performed by: PODIATRIST

## 2024-09-23 PROCEDURE — 1159F MED LIST DOCD IN RCRD: CPT | Mod: CPTII,S$GLB,, | Performed by: PODIATRIST

## 2024-09-23 PROCEDURE — 73630 X-RAY EXAM OF FOOT: CPT | Mod: TC,PO,LT

## 2024-09-23 PROCEDURE — 73630 X-RAY EXAM OF FOOT: CPT | Mod: 26,LT,, | Performed by: INTERNAL MEDICINE

## 2024-09-23 PROCEDURE — 99203 OFFICE O/P NEW LOW 30 MIN: CPT | Mod: S$GLB,,, | Performed by: PODIATRIST

## 2024-09-23 PROCEDURE — 3044F HG A1C LEVEL LT 7.0%: CPT | Mod: CPTII,S$GLB,, | Performed by: PODIATRIST

## 2024-09-24 VITALS — SYSTOLIC BLOOD PRESSURE: 130 MMHG | DIASTOLIC BLOOD PRESSURE: 86 MMHG

## 2024-09-28 NOTE — PROGRESS NOTES
Subjective:      Patient ID: Reshma Rogers is a 48 y.o. female.    Chief Complaint: Foot Pain    Reshma is a 48 y.o. female who presents to the podiatry clinic  with complaint of  left foot pain. Onset of the symptoms was several weeks ago. Precipitating event: none known. Current symptoms include: ability to bear weight, but with some pain, swelling, and worsening symptoms after a period of activity. Aggravating factors: any weight bearing. Symptoms have gradually worsened. Patient has had no prior foot problems. Evaluation to date: none. Treatment to date: none. Patients rates pain 8/10 on pain scale.    Review of Systems   Constitutional: Negative for chills and fever.   Cardiovascular:  Negative for claudication and leg swelling.   Respiratory:  Negative for shortness of breath.    Skin:  Negative for itching, nail changes and rash.   Musculoskeletal:  Negative for muscle cramps, muscle weakness and myalgias.   Gastrointestinal:  Negative for nausea and vomiting.   Neurological:  Negative for focal weakness, loss of balance, numbness and paresthesias.           Objective:      Physical Exam  Constitutional:       General: She is not in acute distress.     Appearance: She is well-developed. She is not diaphoretic.   Cardiovascular:      Pulses:           Dorsalis pedis pulses are 2+ on the right side and 2+ on the left side.        Posterior tibial pulses are 2+ on the right side and 2+ on the left side.      Comments: < 3 sec capillary refill time to toes 1-5 bilateral. Toes and feet are warm to touch proximally with normal distal cooling b/l. There is some hair growth on the feet and toes b/l. There is no edema b/l. No spider veins or varicosities present b/l.     Musculoskeletal:      Comments: Equinus noted b/l ankles with < 10 deg DF noted. MMT 5/5 in DF/PF/Inv/Ev resistance with no reproduction of pain in any direction. Passive range of motion of ankle and pedal joints is painless b/l.    Left  "foot there is mild edema, there is pain with palpation more along the dorsal and plantar fourth metatarsal neck and shaft area and with motion to the metatarsal no pain to adjacent metatarsals or structures   Skin:     General: Skin is warm and dry.      Coloration: Skin is not pale.      Findings: No abrasion, bruising, burn, ecchymosis, erythema, laceration, lesion, petechiae or rash.      Nails: There is no clubbing.      Comments: Skin temperature, texture and turgor within normal limits.   Neurological:      Mental Status: She is alert and oriented to person, place, and time.      Sensory: No sensory deficit.      Motor: No tremor, atrophy or abnormal muscle tone.      Comments: Negative tinel sign bilateral.   Psychiatric:         Behavior: Behavior normal.               Assessment:       Encounter Diagnosis   Name Primary?    Stress fracture of metatarsal bone of left foot, initial encounter Yes         Plan:       Reshma Escalante" was seen today for foot pain.    Diagnoses and all orders for this visit:    Stress fracture of metatarsal bone of left foot, initial encounter  -     X-Ray Foot Complete Left; Future      I counseled the patient on her conditions, their implications and medical management.    Dispensed, fitted and gait trained with orthopedic boot left foot    Stay in the boot 3-6 weeks until pain resolves as this is likely a stress fracture,    X-ray to be done to check for stress fracture but discussed often times we cannot see the stress fracture on the x-ray    Return 3-4 weeks for follow up'    Jesus Branham DPM                     "

## 2024-10-09 ENCOUNTER — PATIENT MESSAGE (OUTPATIENT)
Dept: OBSTETRICS AND GYNECOLOGY | Facility: CLINIC | Age: 49
End: 2024-10-09

## 2024-10-09 ENCOUNTER — OFFICE VISIT (OUTPATIENT)
Dept: OBSTETRICS AND GYNECOLOGY | Facility: CLINIC | Age: 49
End: 2024-10-09
Payer: COMMERCIAL

## 2024-10-09 ENCOUNTER — HOSPITAL ENCOUNTER (OUTPATIENT)
Dept: RADIOLOGY | Facility: HOSPITAL | Age: 49
Discharge: HOME | End: 2024-10-09
Attending: NURSE PRACTITIONER
Payer: COMMERCIAL

## 2024-10-09 VITALS
HEIGHT: 64 IN | SYSTOLIC BLOOD PRESSURE: 128 MMHG | DIASTOLIC BLOOD PRESSURE: 86 MMHG | BODY MASS INDEX: 31.24 KG/M2 | WEIGHT: 183 LBS

## 2024-10-09 VITALS — BODY MASS INDEX: 31.76 KG/M2 | WEIGHT: 186 LBS | HEIGHT: 64 IN

## 2024-10-09 DIAGNOSIS — R63.5 WEIGHT GAIN: ICD-10-CM

## 2024-10-09 DIAGNOSIS — N89.8 VAGINAL IRRITATION: Primary | ICD-10-CM

## 2024-10-09 DIAGNOSIS — Z01.411 ENCOUNTER FOR GYNECOLOGICAL EXAMINATION WITH ABNORMAL FINDING: Primary | ICD-10-CM

## 2024-10-09 DIAGNOSIS — N95.1 PERIMENOPAUSAL: ICD-10-CM

## 2024-10-09 DIAGNOSIS — Z12.31 ENCOUNTER FOR SCREENING MAMMOGRAM FOR BREAST CANCER: ICD-10-CM

## 2024-10-09 PROCEDURE — 3008F BODY MASS INDEX DOCD: CPT | Mod: CPTII,S$GLB,, | Performed by: OBSTETRICS & GYNECOLOGY

## 2024-10-09 PROCEDURE — 99396 PREV VISIT EST AGE 40-64: CPT | Mod: S$GLB,,, | Performed by: OBSTETRICS & GYNECOLOGY

## 2024-10-09 PROCEDURE — 99999 PR PBB SHADOW E&M-EST. PATIENT-LVL III: CPT | Mod: PBBFAC,,, | Performed by: OBSTETRICS & GYNECOLOGY

## 2024-10-09 PROCEDURE — 4010F ACE/ARB THERAPY RXD/TAKEN: CPT | Mod: CPTII,S$GLB,, | Performed by: OBSTETRICS & GYNECOLOGY

## 2024-10-09 PROCEDURE — 3074F SYST BP LT 130 MM HG: CPT | Mod: CPTII,S$GLB,, | Performed by: OBSTETRICS & GYNECOLOGY

## 2024-10-09 PROCEDURE — 1159F MED LIST DOCD IN RCRD: CPT | Mod: CPTII,S$GLB,, | Performed by: OBSTETRICS & GYNECOLOGY

## 2024-10-09 PROCEDURE — 3044F HG A1C LEVEL LT 7.0%: CPT | Mod: CPTII,S$GLB,, | Performed by: OBSTETRICS & GYNECOLOGY

## 2024-10-09 PROCEDURE — 77063 BREAST TOMOSYNTHESIS BI: CPT | Mod: 26,,, | Performed by: RADIOLOGY

## 2024-10-09 PROCEDURE — 3079F DIAST BP 80-89 MM HG: CPT | Mod: CPTII,S$GLB,, | Performed by: OBSTETRICS & GYNECOLOGY

## 2024-10-09 PROCEDURE — 77067 SCR MAMMO BI INCL CAD: CPT | Mod: TC

## 2024-10-09 PROCEDURE — 77067 SCR MAMMO BI INCL CAD: CPT | Mod: 26,,, | Performed by: RADIOLOGY

## 2024-10-09 RX ORDER — PROGESTERONE 100 MG/1
100 CAPSULE ORAL NIGHTLY
Qty: 90 CAPSULE | Refills: 3 | Status: SHIPPED | OUTPATIENT
Start: 2024-10-09 | End: 2025-10-09

## 2024-10-09 NOTE — PROGRESS NOTES
LMP: Patient's last menstrual period was 12/03/2016 (exact date)..    Meds per MD:     Last Pap: Hyst  Last MMG: today  Last Colonoscopy:

## 2024-10-09 NOTE — PROGRESS NOTES
"Chief Complaint Well woman exam:  Annual Exam    She is established.     Reshma Rogers is a 48 y.o. female  presents for a well woman exam.    S/p DAVINCI HYSTERECTOMY and LSO hysterectomy  Seen last year and feeling finee   Then this past year started with hot flashes, night sweats, trouble sleeping  Also with weight gain of 15#  Went to a "hormone" clinic  and got Test pellets and also got estradiol from PCP for hot flashes- has not taken them  Did not like them - caused facial hair and levels were >200  Seeing Navarro- had labs checked and Estradiol 60-85  Fsh was normal  Test back down to 20 and t3 and and T4 were low  Told was perimenopausal and I agree    ROS:  No abdominal pain No vaginal bleeding or discharge  No breast pain or masses, No rectal bleeding        LMP: Patient's last menstrual period was 2016 (exact date)..    Meds per MD:      Last Pap: Hyst  Last MMG: today pending    [facility] birads 2 TC 22%  went to [facility] but opting out of 2x yr testing    Past Medical History:   Diagnosis Date    Abnormal Pap smear of cervix     prior had mild dysplasia treared with cryo 2004,nl since per pt     Anxiety     Anxiety and depression     Asthma     Breast disorder 2017    Left breast Lump    Herpes simplex virus (HSV) infection     History of breast implant removal 2024    Infertility, female     PCOS (polycystic ovarian syndrome)     Polycythemia     gene carrier    Thrombophilia     MTHFR [id number]/ [id number]    Thyroid disease     history of hyporthyroidism       Past Surgical History:   Procedure Laterality Date    AUGMENTATION OF BREAST Bilateral 2024    removed w/ lift    BREAST AUGMENTATION  2006    BREAST BIOPSY Left 2017    BREAST IMPLANT REMOVAL        SECTION      DILATION AND CURETTAGE OF UTERUS      GYNECOLOGIC CRYOSURGERY  2004    HYSTERECTOMY  2016    . Robotic DVH/LSO (Right ovary remains)     OOPHORECTOMY Left 2016    Right ovary " "remains     RHINOPLASTY TIP         OB History    Para Term  AB Living   4 3 2 1 1 1   SAB IAB Ectopic Multiple Live Births   1       1      # Outcome Date GA Lbr Dennis/2nd Weight Sex Type Anes PTL Lv   4   33w0d  2.07 kg (4 lb 9 oz) M CS-LTranv Spinal  RITA   3 SAB  6w0d          2 Term            1 Term                Family History   Problem Relation Name Age of Onset    Breast cancer Maternal Grandmother      Cancer Maternal Grandmother      Deep vein thrombosis Father      Eclampsia Father      Hypertension Father      Ovarian cancer Mother age 77 76        BRCA negative    Diabetes Mother age 77     Stroke Mother age 77     Ovarian cancer Maternal Aunt      Colon cancer Neg Hx         Social History     Tobacco Use    Smoking status: Never    Smokeless tobacco: Never   Substance Use Topics    Alcohol use: Yes     Comment: Occational    Drug use: No         Physical Exam:  /86 (Patient Position: Sitting)   Ht 5' 4" (1.626 m)   Wt 83 kg (182 lb 15.7 oz)   LMP 2016 (Exact Date)   BMI 31.41 kg/m²     APPEARANCE: Well nourished, well developed, in no acute distress.  AFFECT: WNL, alert and oriented x 3  SKIN: No acne or hirsutism  BREASTS: Symmetrical, no skin changes.                      No nipple discharge.   No palpable masses bilaterally  NODES: No inguinal nor axillary LAD  ABDOMEN: soft Non tender Non distended No masses  PELVIC:   Normal external genitalia without lesions.   Normal urethral meatus.   No signif cystocele or rectocele.  Vagina atrophic without lesions or discharge.  Cuff intact  Cervix absent  Adnexa without masses or tenderness.    EXTREMITIES: No edema.    ASSESSMENT AND PLAN  Reshma Escalante" was seen today for annual exam.    Diagnoses and all orders for this visit:    Encounter for gynecological examination with abnormal finding    Perimenopausal  -     progesterone (PROMETRIUM) 100 MG capsule; Take 1 capsule (100 mg total) by mouth " nightly.  -     TSH; Future  -     T4, Free; Future  -     T3, Free; Future  -     Follicle Stimulating Hormone; Future  -     Estradiol; Future  -     Vitamin D; Future    Weight gain  -     TSH; Future  -     T4, Free; Future  -     T3, Free; Future  -     Follicle Stimulating Hormone; Future  -     Estradiol; Future  -     Vitamin D; Future    Patient is status post hysterectomy but her FSH is normal and her at estradiol repeatedly has been between 60 and 85.  I agree that this patient is perimenopausal with her symptoms.  I agree with nurse practitioner Navarro prescribing progesterone for her perimenopausal symptoms.  If her hot flashes get worse patient understands that at that point we might need to start estrogen but for right now her estradiol levels have looked good pretty consistently over the last 6 months.    We talked about optimizing thyroid and patient is interested.    We also talked of about her recent onset of weight gain.  For now she would like to manage with diet exercise and does not want to start GLP1      Patient was counseled today on A.C.S. Pap guidelines and recommendations for yearly pelvic exams, mammograms and monthly self breast exams; to see her PCP for other health maintenance.     Patient encouraged to register for portal and results will be sent via portal.    Follow up in about 3 months (around 1/9/2025) for medication followup and labs .         Health Maintenance   Topic Date Due    Hepatitis C Screening  Never done    TETANUS VACCINE  Never done    Mammogram  06/27/2024    Colorectal Cancer Screening  07/26/2027    Lipid Panel  07/16/2029

## 2024-10-16 ENCOUNTER — PATIENT MESSAGE (OUTPATIENT)
Dept: FAMILY MEDICINE | Facility: CLINIC | Age: 49
End: 2024-10-16
Payer: COMMERCIAL

## 2024-10-16 DIAGNOSIS — Z91.89 AT HIGH RISK FOR BREAST CANCER: Primary | ICD-10-CM

## 2024-10-16 NOTE — TELEPHONE ENCOUNTER
The patient's mammogram was negative for malignancy but her risk score that is done for the lifetime risk of breast cancer was >20%.  It is recommended that anyone over 20% risk be evaluated with further testing.  I would like to schedule an MRI the breast for her at [facility] in 6 months

## 2024-10-16 NOTE — TELEPHONE ENCOUNTER
Patient has already had an MRI in the past and she is being followed Dr Zhu for this. Patient stated that it is not time for her to have another MRI.

## 2024-10-18 RX ORDER — FLUCONAZOLE 150 MG/1
150 TABLET ORAL EVERY OTHER DAY
Qty: 2 TABLET | Refills: 0 | Status: SHIPPED | OUTPATIENT
Start: 2024-10-18 | End: 2024-10-21

## 2024-10-18 RX ORDER — CLOTRIMAZOLE AND BETAMETHASONE DIPROPIONATE 10; .64 MG/G; MG/G
CREAM TOPICAL 2 TIMES DAILY
Qty: 15 G | Refills: 3 | Status: SHIPPED | OUTPATIENT
Start: 2024-10-18 | End: 2024-10-25

## 2024-10-21 ENCOUNTER — OFFICE VISIT (OUTPATIENT)
Dept: PODIATRY | Facility: CLINIC | Age: 49
End: 2024-10-21
Payer: COMMERCIAL

## 2024-10-21 VITALS — BODY MASS INDEX: 31.24 KG/M2 | WEIGHT: 183 LBS | HEIGHT: 64 IN

## 2024-10-21 DIAGNOSIS — M84.375D STRESS FRACTURE OF METATARSAL BONE OF LEFT FOOT WITH ROUTINE HEALING, SUBSEQUENT ENCOUNTER: Primary | ICD-10-CM

## 2024-10-21 PROCEDURE — 99212 OFFICE O/P EST SF 10 MIN: CPT | Mod: S$GLB,,, | Performed by: PODIATRIST

## 2024-10-21 PROCEDURE — 99999 PR PBB SHADOW E&M-EST. PATIENT-LVL III: CPT | Mod: PBBFAC,,, | Performed by: PODIATRIST

## 2024-10-21 PROCEDURE — 4010F ACE/ARB THERAPY RXD/TAKEN: CPT | Mod: CPTII,S$GLB,, | Performed by: PODIATRIST

## 2024-10-21 PROCEDURE — 1159F MED LIST DOCD IN RCRD: CPT | Mod: CPTII,S$GLB,, | Performed by: PODIATRIST

## 2024-10-21 PROCEDURE — 3008F BODY MASS INDEX DOCD: CPT | Mod: CPTII,S$GLB,, | Performed by: PODIATRIST

## 2024-10-21 PROCEDURE — 3044F HG A1C LEVEL LT 7.0%: CPT | Mod: CPTII,S$GLB,, | Performed by: PODIATRIST

## 2024-10-21 PROCEDURE — 1160F RVW MEDS BY RX/DR IN RCRD: CPT | Mod: CPTII,S$GLB,, | Performed by: PODIATRIST

## 2024-10-21 NOTE — PROGRESS NOTES
Subjective:      Patient ID: Reshma Rogers is a 49 y.o. female.    Chief Complaint: Foot Pain    Reshma is a 49 y.o. female who presents to the podiatry clinic  with complaint of  left foot pain. Onset of the symptoms was several weeks ago. Precipitating event: none known. Current symptoms include: ability to bear weight, but with some pain, swelling, and worsening symptoms after a period of activity. Aggravating factors: any weight bearing. Symptoms have gradually worsened. Patient has had no prior foot problems. Evaluation to date: none. Treatment to date: none. Patients rates pain 8/10 on pain scale.    10/21/24: patient returns for follow up stress fracture left fourth metatarsal has been in the orthopedic boot the last few weeks and relates no pain to the area anymore.    Review of Systems   Constitutional: Negative for chills and fever.   Cardiovascular:  Negative for claudication and leg swelling.   Respiratory:  Negative for shortness of breath.    Skin:  Negative for itching, nail changes and rash.   Musculoskeletal:  Negative for muscle cramps, muscle weakness and myalgias.   Gastrointestinal:  Negative for nausea and vomiting.   Neurological:  Negative for focal weakness, loss of balance, numbness and paresthesias.           Objective:      Physical Exam  Constitutional:       General: She is not in acute distress.     Appearance: She is well-developed. She is not diaphoretic.   Cardiovascular:      Pulses:           Dorsalis pedis pulses are 2+ on the right side and 2+ on the left side.        Posterior tibial pulses are 2+ on the right side and 2+ on the left side.      Comments: < 3 sec capillary refill time to toes 1-5 bilateral. Toes and feet are warm to touch proximally with normal distal cooling b/l. There is some hair growth on the feet and toes b/l. There is no edema b/l. No spider veins or varicosities present b/l.     Musculoskeletal:      Comments: Equinus noted b/l ankles with < 10  "deg DF noted. MMT 5/5 in DF/PF/Inv/Ev resistance with no reproduction of pain in any direction. Passive range of motion of ankle and pedal joints is painless b/l.    Left foot there is no edema, there is minimal tenderness with palpation fourth metatarsal neck and shaft area and with motion to the metatarsal no pain to adjacent metatarsals or structures   Skin:     General: Skin is warm and dry.      Coloration: Skin is not pale.      Findings: No abrasion, bruising, burn, ecchymosis, erythema, laceration, lesion, petechiae or rash.      Nails: There is no clubbing.      Comments: Skin temperature, texture and turgor within normal limits.   Neurological:      Mental Status: She is alert and oriented to person, place, and time.      Sensory: No sensory deficit.      Motor: No tremor, atrophy or abnormal muscle tone.      Comments: Negative tinel sign bilateral.   Psychiatric:         Behavior: Behavior normal.               Assessment:       Encounter Diagnosis   Name Primary?    Stress fracture of metatarsal bone of left foot with routine healing, subsequent encounter Yes           Plan:       Reshmarahul Escalante" was seen today for foot pain.    Diagnoses and all orders for this visit:    Stress fracture of metatarsal bone of left foot with routine healing, subsequent encounter        I counseled the patient on her conditions, their implications and medical management.    Can return to normal supportive shoes to toleration    Return PRN if the pain returns    Jesus Branham DPM                       "

## 2025-01-06 DIAGNOSIS — B00.9 HSV INFECTION: ICD-10-CM

## 2025-01-07 RX ORDER — VALACYCLOVIR HYDROCHLORIDE 500 MG/1
TABLET, FILM COATED ORAL
Qty: 180 TABLET | Refills: 1 | Status: SHIPPED | OUTPATIENT
Start: 2025-01-07

## 2025-01-07 NOTE — TELEPHONE ENCOUNTER
Refill Routing Note   Medication(s) are not appropriate for processing by [facility] for the following reason(s):        Due for refill >6 months ago: no dispenses within past 6 months per Epic data     [facility] action(s):  Defer             Appointments  past 12m or future 3m with PCP    Date Provider   Last Visit   10/9/2024 Guero Zhu MD   Next Visit   Visit date not found Guero Zhu MD   ED visits in past 90 days: 0        Note composed:9:04 PM 01/06/2025

## 2025-02-14 NOTE — TELEPHONE ENCOUNTER
Left message--Called to review breast US. US showed stable left breast   Dr. Zhu recommend bilateral mmg at annual visit  
Not getting out of bed. Less sleep than usual. Irregular eating for me.

## 2025-03-06 NOTE — PROGRESS NOTES
"42 yo female presents for postoperative visit s/p [facility] (fibroids) on 12/30/16. She c/o sensitivity and puffiness of RLQ incision. Says skin burns when water hits it in the shower. She placed another steri strip on it when the initial one fell off.    Gen: AAO x 3, NAD  Vitals:    01/27/17 0912   BP: 110/62   Weight: 78.4 kg (172 lb 11.7 oz)   Height: 5' 4" (1.626 m)   PainSc:   2     Incisions: healed x 4, RLQ incision with slight edema (likely due to moisture accumulating under steri strip), knots trimmed from RLQ and LLQ incisions.    Pelvic: deferred      A/P postoperative visit  - reassured.  - keep incisions clean and dry. Can cover RLQ incision with bandaid when in shower until is less sensitive. Dry with cool blow dryer.  - keep appointment with Dr. Zhu for postop  "
Assistance OOB with selected safe patient handling equipment/Assistance with ambulation/Communicate Fall Risk and Risk Factors to all staff, patient, and family/Monitor gait and stability/Move patient closer to nurses' station/Reinforce activity limits and safety measures with patient and family/Reorient to person, place and time as needed/Review medications for side effects contributing to fall risk/Sit up slowly, dangle for a short time, stand at bedside before walking/Toileting schedule using arm’s reach rule for commode and bathroom/Use of alarms - bed, chair and/or voice tab/Visual Cue: Yellow wristband/Bed in lowest position, wheels locked, appropriate side rails in place/Call bell, personal items and telephone in reach/Instruct patient to call for assistance before getting out of bed or chair/Non-slip footwear when patient is out of bed/[place] to call system/Physically safe environment - no spills, clutter or unnecessary equipment/Purposeful Proactive Rounding/Room/bathroom lighting operational, light cord in reach

## 2025-03-26 ENCOUNTER — PATIENT MESSAGE (OUTPATIENT)
Dept: OBSTETRICS AND GYNECOLOGY | Facility: CLINIC | Age: 50
End: 2025-03-26
Payer: COMMERCIAL

## 2025-04-17 ENCOUNTER — PATIENT MESSAGE (OUTPATIENT)
Dept: FAMILY MEDICINE | Facility: CLINIC | Age: 50
End: 2025-04-17
Payer: COMMERCIAL

## 2025-04-17 ENCOUNTER — E-VISIT (OUTPATIENT)
Dept: FAMILY MEDICINE | Facility: CLINIC | Age: 50
End: 2025-04-17
Payer: COMMERCIAL

## 2025-04-17 DIAGNOSIS — J98.9 REACTIVE AIRWAY DISEASE WITHOUT ASTHMA: Primary | ICD-10-CM

## 2025-04-17 DIAGNOSIS — R09.81 SINUS CONGESTION: Primary | ICD-10-CM

## 2025-04-17 RX ORDER — IPRATROPIUM BROMIDE 17 UG/1
2 AEROSOL, METERED RESPIRATORY (INHALATION) EVERY 6 HOURS
Qty: 12.9 G | Refills: 0 | Status: SHIPPED | OUTPATIENT
Start: 2025-04-17 | End: 2025-04-18

## 2025-04-17 RX ORDER — METHYLPREDNISOLONE 4 MG/1
TABLET ORAL
Qty: 21 EACH | Refills: 0 | Status: SHIPPED | OUTPATIENT
Start: 2025-04-17 | End: 2025-05-08

## 2025-04-17 NOTE — PROGRESS NOTES
Patient ID: Reshma Rogers is a 49 y.o. female.    Chief Complaint: URI (Entered automatically based on patient selection in [employer].)    The patient initiated a request through [employer] on 4/17/2025 for evaluation and management with a chief complaint of URI (Entered automatically based on patient selection in [employer].)     I evaluated the questionnaire submission on 4/17/25.    [facility] Peq E-Visit Covid    4/17/2025 11:09 AM CDT - Filed by Patient   Do you agree to participate in an E-Visit? Yes   If you have any of the following symptoms, go to your local emergency room or call 911: I acknowledge   Choose the state of your primary residence [place]   Do you have any of the following pregnancy-related conditions? None   What is the main issue you would like addressed today? Sinuses   Do you think you might have COVID-19 or the Flu? No   What symptoms do you currently have?  Cough;  Stuffy nose;  Runny nose   Describe your cough: Dry   Have you ever smoked? Never smoked   Do you have a fever? No   When did your concern begin? 4/14/2025   In the last two weeks, have you been in close contact with someone who has COVID-19, the Flu, or strep throat? No   What have you tried to help your symptoms? Cold medication;  Drinking more fluids;  Hot shower;  Rest and sleep;  Vitamin C   On a scale of 1-10, where 10 is the worst you can imagine, how severe are your symptoms? (range: 1 - 10) 8   How have your symptoms changed since they first started? No change   Do you have transportation to an [facility] location to get tested for COVID-19? Yes   Provide any additional information you feel is important. Due to history of SVT, unable to take any decongestant. Atrovent works best for me.   Please attach any relevant images or files    Are you able to take your vital signs? Yes   Systolic Blood Pressure: 114   Diastolic Blood Pressure: 68   Weight: 164   Height: 64   Pulse: 72   Temperature: 97.9   Respiration rate:    Pulse  "Oxygen:          Reshma"Sammie" was seen today for uri.    Diagnoses and all orders for this visit:    Reactive airway disease without asthma    Other orders  -     ipratropium (ATROVENT HFA) 17 mcg/actuation inhaler; Inhale 2 puffs into the lungs every 6 (six) hours. Rescue  -     methylPREDNISolone (MEDROL DOSEPACK) 4 mg tablet; use as directed            E-Visit Time Trackin mins                     "

## 2025-04-18 RX ORDER — IPRATROPIUM BROMIDE 21 UG/1
2 SPRAY, METERED NASAL 2 TIMES DAILY
Qty: 30 ML | Refills: 1 | Status: SHIPPED | OUTPATIENT
Start: 2025-04-18

## 2025-06-12 ENCOUNTER — PATIENT MESSAGE (OUTPATIENT)
Dept: OBSTETRICS AND GYNECOLOGY | Facility: CLINIC | Age: 50
End: 2025-06-12
Payer: COMMERCIAL

## 2025-06-13 ENCOUNTER — E-VISIT (OUTPATIENT)
Dept: URGENT CARE | Facility: CLINIC | Age: 50
End: 2025-06-13
Payer: COMMERCIAL

## 2025-06-13 DIAGNOSIS — N30.80 ACUTE BACTERIAL SIMPLE CYSTITIS: Primary | ICD-10-CM

## 2025-06-13 DIAGNOSIS — B96.89 ACUTE BACTERIAL SIMPLE CYSTITIS: Primary | ICD-10-CM

## 2025-06-13 RX ORDER — NITROFURANTOIN 25; 75 MG/1; MG/1
100 CAPSULE ORAL 2 TIMES DAILY
Qty: 10 CAPSULE | Refills: 0 | Status: SHIPPED | OUTPATIENT
Start: 2025-06-13 | End: 2025-06-18

## 2025-06-13 RX ORDER — PHENAZOPYRIDINE HYDROCHLORIDE 200 MG/1
200 TABLET, FILM COATED ORAL 3 TIMES DAILY PRN
Qty: 15 TABLET | Refills: 0 | Status: SHIPPED | OUTPATIENT
Start: 2025-06-13 | End: 2025-06-18

## 2025-06-13 NOTE — PROGRESS NOTES
Patient ID: Reshma Rogers is a 49 y.o. female.        E-Visit Time Tracking:   Day 1 Time (in minutes): 12  Total Time (in minutes): 12      Chief Complaint: General Illness (Entered automatically based on patient selection in [employer].)      The patient initiated a request through [employer] on 6/13/2025 for evaluation and management with a chief complaint of General Illness (Entered automatically based on patient selection in [employer].)     I evaluated the questionnaire submission on 06/13/2025.    [facility] Peq Evisit Supergroup-Common Problems    6/13/2025 11:55 AM CDT - Filed by Patient   What do you need help with? Urinary Symptoms   Do you agree to participate in an E-Visit? Yes   If you have any of the following symptoms, please go to the nearest emergency room or call 911: I acknowledge   Do you have any of the following pregnancy-related conditions? None   What is the main issue you would like addressed today? UTI   Do you have any of the following symptoms? Discomfort or pain passing urine;  Passing urine more frequently;  Cloudy urine   When did your concern begin? 6/12/2025   What medications or treatments have you used to help your symptoms? Drinking more fluids;  Other   What other medications or treatments have you used for your symptoms? AZO standard   What does your urine look like? Cloudy   Have you had any of the following symptoms during the past 24 hours?   Urgency (a sudden and uncontrollable urge to urinate) Severe   Frequency (going to the toilet very often) Severe   Burning pain when urinating Moderate   Incomplete bladder emptying (still feel like you need to urinate again after urination) (None, Mild, Moderate, Severe) Moderate   Pain in the lower belly when youre not urinating. Severe   Discomfort from your urinary symptoms. Severe   Have you had any of the following symptoms during the past 24 hours?   Blood seen in the urine None   Pain in the lower back on one or both sides (flank  pain) None   Abnormal Vaginal Discharge (abnormal amount, color and/or odor) Mild   Discharge from the opening you urinate from (urethra) when not urinating. None   Have your symptoms interfered with your every day activities? Mild   Do you have a fever? No   Are you a diabetic? No   Are you currently experiencing any of the following while completing this questionnaire? None   Do you have a history of kidney stones? No   Provide any additional information you feel is important.    Please attach any relevant images or files (if you have performed a home test for UTI, please submit a photo of results)    Are you able to take your vital signs? Yes   Systolic Blood Pressure: 112   Diastolic Blood Pressure: 66   Weight: 157   Height: 64   Pulse:    Temperature: 97.8   Respiration rate:    Pulse Oxygen:          Encounter Diagnosis   Name Primary?    Acute bacterial simple cystitis Yes        Orders Placed This Encounter   Procedures    Urinalysis, Reflex to Urine Culture Urine, Clean Catch     Standing Status:   Future     Expected Date:   6/13/2025     Expiration Date:   7/13/2025     Preferred Collection Type:   Urine, Clean Catch     Specimen Source:   Urine      Medications Ordered This Encounter   Medications    nitrofurantoin, macrocrystal-monohydrate, (MACROBID) 100 MG capsule     Sig: Take 1 capsule (100 mg total) by mouth 2 (two) times daily. for 5 days     Dispense:  10 capsule     Refill:  0    phenazopyridine (PYRIDIUM) 200 MG tablet     Sig: Take 1 tablet (200 mg total) by mouth 3 (three) times daily as needed for Pain.     Dispense:  15 tablet     Refill:  0        No follow-ups on file.

## 2025-07-28 ENCOUNTER — OFFICE VISIT (OUTPATIENT)
Dept: FAMILY MEDICINE | Facility: CLINIC | Age: 50
End: 2025-07-28
Payer: COMMERCIAL

## 2025-07-28 VITALS
WEIGHT: 159.63 LBS | RESPIRATION RATE: 16 BRPM | HEIGHT: 64 IN | DIASTOLIC BLOOD PRESSURE: 86 MMHG | HEART RATE: 70 BPM | BODY MASS INDEX: 27.25 KG/M2 | TEMPERATURE: 99 F | OXYGEN SATURATION: 98 % | SYSTOLIC BLOOD PRESSURE: 126 MMHG

## 2025-07-28 DIAGNOSIS — J02.9 SORE THROAT: Primary | ICD-10-CM

## 2025-07-28 LAB
CTP QC/QA: YES
MOLECULAR STREP A: NEGATIVE

## 2025-07-28 PROCEDURE — 4010F ACE/ARB THERAPY RXD/TAKEN: CPT | Mod: CPTII,S$GLB,, | Performed by: NURSE PRACTITIONER

## 2025-07-28 PROCEDURE — 3079F DIAST BP 80-89 MM HG: CPT | Mod: CPTII,S$GLB,, | Performed by: NURSE PRACTITIONER

## 2025-07-28 PROCEDURE — 87651 STREP A DNA AMP PROBE: CPT | Mod: QW,,, | Performed by: NURSE PRACTITIONER

## 2025-07-28 PROCEDURE — 3008F BODY MASS INDEX DOCD: CPT | Mod: CPTII,S$GLB,, | Performed by: NURSE PRACTITIONER

## 2025-07-28 PROCEDURE — 99214 OFFICE O/P EST MOD 30 MIN: CPT | Mod: S$GLB,,, | Performed by: NURSE PRACTITIONER

## 2025-07-28 PROCEDURE — 1159F MED LIST DOCD IN RCRD: CPT | Mod: CPTII,S$GLB,, | Performed by: NURSE PRACTITIONER

## 2025-07-28 PROCEDURE — 1160F RVW MEDS BY RX/DR IN RCRD: CPT | Mod: CPTII,S$GLB,, | Performed by: NURSE PRACTITIONER

## 2025-07-28 PROCEDURE — 3074F SYST BP LT 130 MM HG: CPT | Mod: CPTII,S$GLB,, | Performed by: NURSE PRACTITIONER

## 2025-07-28 RX ORDER — LEVOTHYROXINE, LIOTHYRONINE 38; 9 UG/1; UG/1
60 TABLET ORAL EVERY MORNING
COMMUNITY
Start: 2025-07-22

## 2025-07-28 NOTE — PROGRESS NOTES
Subjective:       Patient ID: Reshma Rogers is a 49 y.o. female.    Chief Complaint: Sore Throat    Sore Throat   Associated symptoms include coughing (from sensation to clear her throat). Pertinent negatives include no abdominal pain, congestion, diarrhea, drooling, ear discharge, ear pain, headaches, shortness of breath or vomiting.     Here with concerns for sore throat. States she has been sick for over two weeks. She went to Urgent care about 4 days after symptoms started and was tested for Covid and flu which were both negative. She was given Zithromax, which she completed. She states symptoms did not resolve. She has had low grade fever around 100.  States the pain is not deep down her throat, but more at the back of her tongue, especially on the right side. States the area feels swollen. States she feels that her throat is constricted. She has discomfort with swallowing. States it is red, and inflamed. She states she does not feel like she has PND. She denies nasal congestion. She states mild cough but feels that is more from the sensation of wanting to clear her throat. No chest congestion, wheezing or SOB. She denies any acid reflux, nausea, vomiting, abdominal pain.  States she has had Increased fatigue. She denies bruising easily or bloody gums when brushing teeth. States she is eating and drinking fine. She has not made any diet changes. See ROS    The following portion of the patients history was reviewed and updated as appropriate: allergies, current medications, past medical and surgical history. Past social history and problem list reviewed. Family PMH and Past social history reviewed. Tobacco, Illicit drug use reviewed.      Review of patient's allergies indicates:   Allergen Reactions    Ambien [zolpidem] Hallucinations    Aspirin Other (See Comments)     Bruises easily    Nsaids (non-steroidal anti-inflammatory drug) Other (See Comments)     Bruises easily    Polysporin [bacitracin  zinc-polymyxin b]     Neosporin [neomycin-bacitracin-polymyxin] Rash       Current Medications[1]    Past Medical History:   Diagnosis Date    Abnormal Pap smear of cervix     prior had mild dysplasia treared with cryo 2004,nl since per pt     Anxiety     Anxiety and depression     Asthma     Breast disorder 2017    Left breast Lump    Herpes simplex virus (HSV) infection     History of breast implant removal 2024    Infertility, female     PCOS (polycystic ovarian syndrome)     Polycythemia     gene carrier    Thrombophilia     MTHFR [id number]/ [id number]    Thyroid disease     history of hyporthyroidism       Past Surgical History:   Procedure Laterality Date    AUGMENTATION OF BREAST Bilateral 2024    removed w/ lift    BREAST AUGMENTATION  2006    BREAST BIOPSY Left 2017    BREAST IMPLANT REMOVAL        SECTION      DILATION AND CURETTAGE OF UTERUS      GYNECOLOGIC CRYOSURGERY  2004    HYSTERECTOMY  2016    . Robotic DVH/LSO (Right ovary remains)     OOPHORECTOMY Left 2016    Right ovary remains     RHINOPLASTY TIP         Social History[2]    Review of Systems   Constitutional:  Positive for fatigue. Negative for fever.   HENT:  Positive for sore throat. Negative for congestion, dental problem, drooling, ear discharge, ear pain, postnasal drip, rhinorrhea, sinus pressure, sinus pain and voice change.         Glands swollen R>L   Eyes:  Negative for visual disturbance.   Respiratory:  Positive for cough (from sensation to clear her throat). Negative for chest tightness, shortness of breath and wheezing.    Cardiovascular:  Negative for chest pain, palpitations and leg swelling.   Gastrointestinal:  Negative for abdominal pain, diarrhea, nausea and vomiting.   Genitourinary: Negative.    Musculoskeletal:  Negative for arthralgias, back pain and gait problem.   Skin: Negative.    Neurological:  Negative for headaches.   Psychiatric/Behavioral:  Negative for sleep disturbance.   "      Objective:      /86 (BP Location: Left arm, Patient Position: Sitting)   Pulse 70   Temp (!) 100.8 °F (38.2 °C) (Temporal)   Resp 16   Ht 5' 4" (1.626 m)   Wt 72.4 kg (159 lb 9.8 oz)   LMP 12/03/2016 (Exact Date)   SpO2 98%   BMI 27.40 kg/m²      Physical Exam  Constitutional:       Appearance: Normal appearance. She is normal weight.   HENT:      Head: Normocephalic.      Right Ear: Tympanic membrane, ear canal and external ear normal.      Left Ear: Tympanic membrane, ear canal and external ear normal.      Nose: Nose normal. No congestion or rhinorrhea.      Mouth/Throat:      Mouth: Mucous membranes are moist.      Pharynx: No oropharyngeal exudate, uvula swelling or postnasal drip.     Eyes:      Pupils: Pupils are equal, round, and reactive to light.   Neck:      Thyroid: No thyromegaly.      Vascular: No carotid bruit.   Cardiovascular:      Rate and Rhythm: Normal rate and regular rhythm.      Pulses: Normal pulses.      Heart sounds: Normal heart sounds. No murmur heard.  Pulmonary:      Effort: Pulmonary effort is normal.      Breath sounds: Normal breath sounds. No decreased breath sounds or wheezing.   Musculoskeletal:         General: Normal range of motion.      Cervical back: Normal range of motion.      Comments: Gait normal.    Lymphadenopathy:      Head:      Right side of head: Submandibular adenopathy present.      Left side of head: No submandibular adenopathy.   Skin:     General: Skin is warm and dry.      Capillary Refill: Capillary refill takes less than 2 seconds.   Neurological:      General: No focal deficit present.      Mental Status: She is alert.   Psychiatric:         Attention and Perception: Attention and perception normal.         Mood and Affect: Mood and affect normal.         Speech: Speech normal.         Behavior: Behavior normal.         Assessment:       1. Sore throat        Plan:       Sore throat: not sure what the cause of her sore throat is. Will " refer to ENT for further evaluation. We were able to get her an appointment Wednesday.  Strep was negative. I see no indication of abscess.   -     POCT Strep A, Molecular  -     Ambulatory referral/consult to ENT; Future; Expected date: 08/04/2025       Continue current medication  Take medications only as prescribed  Healthy diet  Adequate rest  Adequate hydration  Avoid allergens  Avoid excessive caffeine     Follow up if symptoms worsen before getting in to see ENT         [1]   Current Outpatient Medications:     ipratropium (ATROVENT) 21 mcg (0.03 %) nasal spray, 2 sprays by Each Nostril route 2 (two) times daily., Disp: 30 mL, Rfl: 1    losartan (COZAAR) 50 MG tablet, Take 1 tablet (50 mg total) by mouth once daily., Disp: 90 tablet, Rfl: 3    NP THYROID 60 mg Tab, Take 60 mg by mouth every morning., Disp: , Rfl:     PROGESTERONE MICRONIZED ORAL, , Disp: , Rfl:     tretinoin (RETIN-A) 0.05 % cream, APPLY TO THE AFFECTED AREA NIGHTLY AS DIRECTED, Disp: , Rfl:     valACYclovir (VALTREX) 500 MG tablet, TAKE 1 TABLET(500 MG) BY MOUTH TWICE DAILY AS NEEDED FOR OUTBREAK, Disp: 180 tablet, Rfl: 1  [2]   Social History  Socioeconomic History    Marital status:     Number of children: 1   Tobacco Use    Smoking status: Never    Smokeless tobacco: Never   Substance and Sexual Activity    Alcohol use: Yes     Comment: Occational    Drug use: No    Sexual activity: Yes     Partners: Male     Birth control/protection: Surgical     Comment: :  Maxi   Social History Narrative    Son w/ ALL, previous  pt.     Social Drivers of Health     Financial Resource Strain: Low Risk  (7/27/2025)    Overall Financial Resource Strain (CARDIA)     Difficulty of Paying Living Expenses: Not hard at all   Food Insecurity: No Food Insecurity (7/27/2025)    Hunger Vital Sign     Worried About Running Out of Food in the Last Year: Never true     Ran Out of Food in the Last Year: Never true   Transportation Needs: No  Transportation Needs (7/27/2025)    PRAPARE - Transportation     Lack of Transportation (Medical): No     Lack of Transportation (Non-Medical): No   Physical Activity: Sufficiently Active (7/27/2025)    Exercise Vital Sign     Days of Exercise per Week: 3 days     Minutes of Exercise per Session: 60 min   Stress: No Stress Concern Present (7/27/2025)    [employer] [place] [employer] - Occupational Stress Questionnaire     Feeling of Stress : Not at all   Housing Stability: Low Risk  (7/27/2025)    Housing Stability Vital Sign     Unable to Pay for Housing in the Last Year: No     Number of Times Moved in the Last Year: 0     Homeless in the Last Year: No

## 2025-07-29 ENCOUNTER — OFFICE VISIT (OUTPATIENT)
Dept: OTOLARYNGOLOGY | Facility: CLINIC | Age: 50
End: 2025-07-29
Payer: COMMERCIAL

## 2025-07-29 VITALS — WEIGHT: 159.19 LBS | BODY MASS INDEX: 27.18 KG/M2 | HEIGHT: 64 IN

## 2025-07-29 DIAGNOSIS — J03.90 TONSILLITIS: Primary | ICD-10-CM

## 2025-07-29 DIAGNOSIS — J02.9 SORE THROAT: ICD-10-CM

## 2025-07-29 PROCEDURE — 99999 PR PBB SHADOW E&M-EST. PATIENT-LVL III: CPT | Mod: PBBFAC,,, | Performed by: STUDENT IN AN ORGANIZED HEALTH CARE EDUCATION/TRAINING PROGRAM

## 2025-07-29 RX ORDER — CHLORHEXIDINE GLUCONATE ORAL RINSE 1.2 MG/ML
15 SOLUTION DENTAL 2 TIMES DAILY
Qty: 300 ML | Refills: 0 | Status: SHIPPED | OUTPATIENT
Start: 2025-07-29 | End: 2025-08-08

## 2025-07-29 RX ORDER — PREDNISONE 20 MG/1
20 TABLET ORAL DAILY
Qty: 7 TABLET | Refills: 0 | Status: SHIPPED | OUTPATIENT
Start: 2025-07-29 | End: 2025-08-05

## 2025-07-29 NOTE — PROGRESS NOTES
"Otolaryngology Clinic Note    Subjective:       Patient ID: Reshma Rogers is a 49 y.o. female.    Chief Complaint: Sore Throat (reddness)      History of Present Illness: Reshma Rogers is a 49 y.o. female presenting with sore throat. Reports at tonsil/BOT area. Right feels worse than left. Used iodine swab and did not help. Son was sick with PND prior to her getting sick. Not terrible, not further down throat. No steroids given. Just zpack as below. She has been coughing due to irritation. Felt worse after exam. [place] like throat was tight yesterday. She does feel some PND. No reflux.   Went to NP yesterday with hx "sick for over two weeks. She went to Urgent care about 4 days after symptoms started and was tested for Covid and flu which were both negative. She was given Zithromax, which she completed. She states symptoms did not resolve. She has had low grade fever around 100. States the pain is not deep down her throat, but more at the back of her tongue, especially on the right side. States the area feels swollen. States she feels that her throat is constricted. She has discomfort with swallowing. States it is red, and inflamed. She states she does not feel like she has PND. She denies nasal congestion. She states mild cough but feels that is more from the sensation of wanting to clear her throat. No chest congestion, wheezing or SOB. She denies any acid reflux, nausea, vomiting, abdominal pain." Saw red tissue, strep negative, and sent here.       Past Surgical History:   Procedure Laterality Date    AUGMENTATION OF BREAST Bilateral 2024    removed w/ lift    BREAST AUGMENTATION  2006    BREAST BIOPSY Left 2017    BREAST IMPLANT REMOVAL        SECTION      DILATION AND CURETTAGE OF UTERUS      GYNECOLOGIC CRYOSURGERY  2004    HYSTERECTOMY  2016    . Robotic DVH/LSO (Right ovary remains)     OOPHORECTOMY Left 2016    Right ovary remains     RHINOPLASTY TIP  " 2005     Past Medical History:   Diagnosis Date    Abnormal Pap smear of cervix     prior had mild dysplasia treared with cryo 2004,nl since per pt     Anxiety     Anxiety and depression     Asthma     Breast disorder 04/2017    Left breast Lump    Herpes simplex virus (HSV) infection     History of breast implant removal 03/2024    Infertility, female     PCOS (polycystic ovarian syndrome)     Polycythemia     gene carrier    Thrombophilia     MTHFR [id number]/ [id number]    Thyroid disease     history of hyporthyroidism     Social Drivers of Health     Tobacco Use: Low Risk  (7/28/2025)    Patient History     Smoking Tobacco Use: Never     Smokeless Tobacco Use: Never     Passive Exposure: Not on file   Alcohol Use: Not At Risk (7/27/2025)    AUDIT-C     Frequency of Alcohol Consumption: Monthly or less     Average Number of Drinks: 1 or 2     Frequency of Binge Drinking: Never   Financial Resource Strain: Low Risk  (7/27/2025)    Overall Financial Resource Strain (CARDIA)     Difficulty of Paying Living Expenses: Not hard at all   Food Insecurity: No Food Insecurity (7/27/2025)    Hunger Vital Sign     Worried About Running Out of Food in the Last Year: Never true     Ran Out of Food in the Last Year: Never true   Transportation Needs: No Transportation Needs (7/27/2025)    PRAPARE - Transportation     Lack of Transportation (Medical): No     Lack of Transportation (Non-Medical): No   Physical Activity: Sufficiently Active (7/27/2025)    Exercise Vital Sign     Days of Exercise per Week: 3 days     Minutes of Exercise per Session: 60 min   Stress: No Stress Concern Present (7/27/2025)    [employer] [place] [employer] - Occupational Stress Questionnaire     Feeling of Stress : Not at all   Housing Stability: Low Risk  (7/27/2025)    Housing Stability Vital Sign     Unable to Pay for Housing in the Last Year: No     Number of Times Moved in the Last Year: 0     Homeless in the Last Year: No   Depression: Low  Risk  (7/28/2025)    Depression     Last PHQ-4: Flowsheet Data: 0   Utilities: Not At Risk (7/27/2025)    [facility]     Threatened with loss of utilities: No   Health Literacy: Adequate Health Literacy (7/27/2025)     Health Literacy     Frequency of need for help with medical instructions: Never   Social Isolation: Not on file     Review of patient's allergies indicates:   Allergen Reactions    Ambien [zolpidem] Hallucinations    Aspirin Other (See Comments)     Bruises easily    Nsaids (non-steroidal anti-inflammatory drug) Other (See Comments)     Bruises easily    Polysporin [bacitracin zinc-polymyxin b]     Neosporin [neomycin-bacitracin-polymyxin] Rash     Current Outpatient Medications   Medication Instructions    ipratropium (ATROVENT) 21 mcg (0.03 %) nasal spray 2 sprays, Each Nostril, 2 times daily    losartan (COZAAR) 50 mg, Oral, Daily    NP THYROID 60 mg, Every morning    PROGESTERONE MICRONIZED ORAL     tretinoin (RETIN-A) 0.05 % cream APPLY TO THE AFFECTED AREA NIGHTLY AS DIRECTED    valACYclovir (VALTREX) 500 MG tablet TAKE 1 TABLET(500 MG) BY MOUTH TWICE DAILY AS NEEDED FOR OUTBREAK         ENT ROS negative except as stated above.     Patient answers are not available for this visit.            Objective:      There were no vitals filed for this visit.    General: NAD, well appearing  Eyes: Normal conjunctiva and lids  Face: symmetric, nerve intact  Nose: The nose is without any evidence of any deformity. The nasal mucosa is moist. The septum is midline. There is no evidence of septal hematoma. The turbinates are without abnormality.   Ears: The ears are with normal-appearing pinna. Examination of the canals is normal appearing bilaterally. There is no drainage or erythema noted. The tympanic membranes are normal appearing with pearly color, normal-appearing landmarks and normal light reflex. Hearing is grossly intact.  Mouth: No obvious abnormalities to the lips. The teeth are  unremarkable. The gingivae are without any obvious evidence of infection or lesion. The oral mucosa is moist and pink. There are no obvious masses to the hard or soft palate.   Oropharynx: The uvula is midline.  The tongue is midline. The posterior pharynx is without erythema or exudate. Has some lymphatic tissue rests at GTS BL. The tonsils are normal appearing 1+, cryptic.  Salivary glands: The salivary glands are symmetric and not enlarged, no masses  Neck: No lymphadenopathy, trachea midline, thryoid not enlarged.  Psych: Normal mood and affect.   Neuro: Grossly intact  Speech: fluent       Assessment and Plan:       1. Tonsillitis    2. Sore throat          Peridex 10 days now. Prednisone 7 days now.   Contact if not improving    RTC: PRN    Plan of care was discussed in detail with the patient, who agreed with the plan as above. All questions were answered in detail.     Keiko Jeffries MD  Otolaryngology

## 2025-07-30 DIAGNOSIS — I10 HYPERTENSION, UNSPECIFIED TYPE: ICD-10-CM
